# Patient Record
Sex: FEMALE | Race: BLACK OR AFRICAN AMERICAN | NOT HISPANIC OR LATINO | Employment: FULL TIME | RURAL
[De-identification: names, ages, dates, MRNs, and addresses within clinical notes are randomized per-mention and may not be internally consistent; named-entity substitution may affect disease eponyms.]

---

## 2018-01-09 ENCOUNTER — HISTORICAL (OUTPATIENT)
Dept: ADMINISTRATIVE | Facility: HOSPITAL | Age: 43
End: 2018-01-09

## 2018-01-10 LAB
LAB AP CLINICAL INFORMATION: NORMAL
LAB AP COMMENTS: NORMAL
LAB AP DIAGNOSIS - HISTORICAL: NORMAL
LAB AP GROSS PATHOLOGY - HISTORICAL: NORMAL
LAB AP SPECIMEN SUBMITTED - HISTORICAL: NORMAL

## 2021-05-19 ENCOUNTER — OFFICE VISIT (OUTPATIENT)
Dept: FAMILY MEDICINE | Facility: CLINIC | Age: 46
End: 2021-05-19
Payer: COMMERCIAL

## 2021-05-19 DIAGNOSIS — I10 ESSENTIAL HYPERTENSION: ICD-10-CM

## 2021-05-19 DIAGNOSIS — E03.0 CONGENITAL HYPOTHYROIDISM WITH DIFFUSE GOITER: ICD-10-CM

## 2021-05-19 DIAGNOSIS — E78.2 MIXED HYPERLIPIDEMIA: ICD-10-CM

## 2021-05-19 DIAGNOSIS — G89.29 CHRONIC BACK PAIN, UNSPECIFIED BACK LOCATION, UNSPECIFIED BACK PAIN LATERALITY: ICD-10-CM

## 2021-05-19 DIAGNOSIS — M54.9 CHRONIC BACK PAIN, UNSPECIFIED BACK LOCATION, UNSPECIFIED BACK PAIN LATERALITY: ICD-10-CM

## 2021-05-19 DIAGNOSIS — F32.A DEPRESSION, UNSPECIFIED DEPRESSION TYPE: Primary | ICD-10-CM

## 2021-05-19 PROCEDURE — 99214 PR OFFICE/OUTPT VISIT, EST, LEVL IV, 30-39 MIN: ICD-10-PCS | Mod: ,,, | Performed by: FAMILY MEDICINE

## 2021-05-19 PROCEDURE — 99214 OFFICE O/P EST MOD 30 MIN: CPT | Mod: ,,, | Performed by: FAMILY MEDICINE

## 2021-05-19 RX ORDER — AMLODIPINE BESYLATE 10 MG/1
10 TABLET ORAL DAILY
Qty: 30 TABLET | Refills: 5 | Status: SHIPPED | OUTPATIENT
Start: 2021-05-19 | End: 2021-12-28 | Stop reason: SDUPTHER

## 2021-05-19 RX ORDER — POTASSIUM CHLORIDE 750 MG/1
10 TABLET, EXTENDED RELEASE ORAL DAILY
Qty: 30 TABLET | Refills: 5 | Status: SHIPPED | OUTPATIENT
Start: 2021-05-19 | End: 2021-12-28 | Stop reason: SDUPTHER

## 2021-05-19 RX ORDER — FLUOXETINE HYDROCHLORIDE 20 MG/1
20 CAPSULE ORAL DAILY
COMMUNITY
Start: 2021-04-05 | End: 2021-05-19 | Stop reason: SDUPTHER

## 2021-05-19 RX ORDER — METHOCARBAMOL 500 MG/1
500 TABLET, FILM COATED ORAL 4 TIMES DAILY
Qty: 40 TABLET | Refills: 0 | Status: SHIPPED | OUTPATIENT
Start: 2021-05-19 | End: 2021-05-29

## 2021-05-19 RX ORDER — HYDROCHLOROTHIAZIDE 25 MG/1
25 TABLET ORAL DAILY
Qty: 30 TABLET | Refills: 5 | Status: SHIPPED | OUTPATIENT
Start: 2021-05-19 | End: 2021-12-28 | Stop reason: SDUPTHER

## 2021-05-19 RX ORDER — POTASSIUM CHLORIDE 750 MG/1
10 TABLET, EXTENDED RELEASE ORAL DAILY
COMMUNITY
Start: 2021-04-05 | End: 2021-05-19 | Stop reason: SDUPTHER

## 2021-05-19 RX ORDER — HYDROCHLOROTHIAZIDE 25 MG/1
25 TABLET ORAL DAILY
COMMUNITY
Start: 2021-04-05 | End: 2021-05-19 | Stop reason: SDUPTHER

## 2021-05-19 RX ORDER — FLUOXETINE HYDROCHLORIDE 20 MG/1
20 CAPSULE ORAL DAILY
Qty: 30 CAPSULE | Refills: 5 | Status: SHIPPED | OUTPATIENT
Start: 2021-05-19 | End: 2021-12-28 | Stop reason: SDUPTHER

## 2021-05-19 RX ORDER — LEVOTHYROXINE SODIUM 150 UG/1
150 TABLET ORAL DAILY
COMMUNITY
Start: 2021-04-05 | End: 2021-05-19 | Stop reason: SDUPTHER

## 2021-05-19 RX ORDER — LEVOTHYROXINE SODIUM 150 UG/1
150 TABLET ORAL DAILY
Qty: 30 TABLET | Refills: 5 | Status: SHIPPED | OUTPATIENT
Start: 2021-05-19 | End: 2021-12-28 | Stop reason: SDUPTHER

## 2021-05-19 RX ORDER — AMLODIPINE BESYLATE 10 MG/1
10 TABLET ORAL DAILY
COMMUNITY
Start: 2021-04-05 | End: 2021-05-19 | Stop reason: SDUPTHER

## 2021-05-19 RX ORDER — LOVASTATIN 10 MG/1
10 TABLET ORAL DAILY
Qty: 30 TABLET | Refills: 5 | Status: SHIPPED | OUTPATIENT
Start: 2021-05-19 | End: 2021-12-28 | Stop reason: SDUPTHER

## 2021-05-19 RX ORDER — MELOXICAM 7.5 MG/1
15 TABLET ORAL DAILY
Qty: 30 TABLET | Refills: 2 | Status: SHIPPED | OUTPATIENT
Start: 2021-05-19 | End: 2021-12-28 | Stop reason: SDUPTHER

## 2021-05-19 RX ORDER — LOVASTATIN 10 MG/1
10 TABLET ORAL DAILY
COMMUNITY
Start: 2021-04-05 | End: 2021-05-19 | Stop reason: SDUPTHER

## 2021-05-19 RX ORDER — MELOXICAM 7.5 MG/1
7.5 TABLET ORAL 2 TIMES DAILY
COMMUNITY
Start: 2021-04-05 | End: 2021-05-19 | Stop reason: SDUPTHER

## 2021-12-28 ENCOUNTER — OFFICE VISIT (OUTPATIENT)
Dept: FAMILY MEDICINE | Facility: CLINIC | Age: 46
End: 2021-12-28
Payer: COMMERCIAL

## 2021-12-28 VITALS
DIASTOLIC BLOOD PRESSURE: 76 MMHG | BODY MASS INDEX: 53.09 KG/M2 | HEART RATE: 87 BPM | HEIGHT: 61 IN | RESPIRATION RATE: 18 BRPM | WEIGHT: 281.19 LBS | SYSTOLIC BLOOD PRESSURE: 120 MMHG | OXYGEN SATURATION: 98 %

## 2021-12-28 DIAGNOSIS — M54.9 CHRONIC BACK PAIN, UNSPECIFIED BACK LOCATION, UNSPECIFIED BACK PAIN LATERALITY: ICD-10-CM

## 2021-12-28 DIAGNOSIS — Z12.31 SCREENING MAMMOGRAM, ENCOUNTER FOR: ICD-10-CM

## 2021-12-28 DIAGNOSIS — G89.29 CHRONIC BACK PAIN, UNSPECIFIED BACK LOCATION, UNSPECIFIED BACK PAIN LATERALITY: ICD-10-CM

## 2021-12-28 DIAGNOSIS — E78.2 MIXED HYPERLIPIDEMIA: ICD-10-CM

## 2021-12-28 DIAGNOSIS — F32.A DEPRESSION, UNSPECIFIED DEPRESSION TYPE: ICD-10-CM

## 2021-12-28 DIAGNOSIS — I10 ESSENTIAL HYPERTENSION: Primary | ICD-10-CM

## 2021-12-28 DIAGNOSIS — J01.00 ACUTE NON-RECURRENT MAXILLARY SINUSITIS: ICD-10-CM

## 2021-12-28 DIAGNOSIS — E03.0 CONGENITAL HYPOTHYROIDISM WITH DIFFUSE GOITER: ICD-10-CM

## 2021-12-28 DIAGNOSIS — E03.9 HYPOTHYROIDISM, UNSPECIFIED TYPE: ICD-10-CM

## 2021-12-28 LAB
ALBUMIN SERPL BCP-MCNC: 3.4 G/DL (ref 3.5–5)
ALBUMIN/GLOB SERPL: 0.7 {RATIO}
ALP SERPL-CCNC: 65 U/L (ref 39–100)
ALT SERPL W P-5'-P-CCNC: 21 U/L (ref 13–56)
ANION GAP SERPL CALCULATED.3IONS-SCNC: 9 MMOL/L (ref 7–16)
AST SERPL W P-5'-P-CCNC: 21 U/L (ref 15–37)
BASOPHILS # BLD AUTO: 0.04 K/UL (ref 0–0.2)
BASOPHILS NFR BLD AUTO: 0.2 % (ref 0–1)
BILIRUB SERPL-MCNC: 0.3 MG/DL (ref 0–1.2)
BUN SERPL-MCNC: 20 MG/DL (ref 7–18)
BUN/CREAT SERPL: 9 (ref 6–20)
CALCIUM SERPL-MCNC: 8.1 MG/DL (ref 8.5–10.1)
CHLORIDE SERPL-SCNC: 102 MMOL/L (ref 98–107)
CHOLEST SERPL-MCNC: 245 MG/DL (ref 0–200)
CHOLEST/HDLC SERPL: 4.9 {RATIO}
CO2 SERPL-SCNC: 32 MMOL/L (ref 21–32)
CREAT SERPL-MCNC: 2.23 MG/DL (ref 0.55–1.02)
DIFFERENTIAL METHOD BLD: ABNORMAL
EOSINOPHIL # BLD AUTO: 0.25 K/UL (ref 0–0.5)
EOSINOPHIL NFR BLD AUTO: 1.6 % (ref 1–4)
ERYTHROCYTE [DISTWIDTH] IN BLOOD BY AUTOMATED COUNT: 14.6 % (ref 11.5–14.5)
GLOBULIN SER-MCNC: 4.8 G/DL (ref 2–4)
GLUCOSE SERPL-MCNC: 102 MG/DL (ref 74–106)
HCT VFR BLD AUTO: 38.6 % (ref 38–47)
HDLC SERPL-MCNC: 50 MG/DL (ref 40–60)
HGB BLD-MCNC: 12.5 G/DL (ref 12–16)
IMM GRANULOCYTES # BLD AUTO: 0.07 K/UL (ref 0–0.04)
IMM GRANULOCYTES NFR BLD: 0.4 % (ref 0–0.4)
LDLC SERPL CALC-MCNC: 144 MG/DL
LDLC/HDLC SERPL: 2.9 {RATIO}
LYMPHOCYTES # BLD AUTO: 3.63 K/UL (ref 1–4.8)
LYMPHOCYTES NFR BLD AUTO: 22.6 % (ref 27–41)
MCH RBC QN AUTO: 28.7 PG (ref 27–31)
MCHC RBC AUTO-ENTMCNC: 32.4 G/DL (ref 32–36)
MCV RBC AUTO: 88.7 FL (ref 80–96)
MONOCYTES # BLD AUTO: 0.87 K/UL (ref 0–0.8)
MONOCYTES NFR BLD AUTO: 5.4 % (ref 2–6)
MPC BLD CALC-MCNC: 10.6 FL (ref 9.4–12.4)
NEUTROPHILS # BLD AUTO: 11.22 K/UL (ref 1.8–7.7)
NEUTROPHILS NFR BLD AUTO: 69.8 % (ref 53–65)
NONHDLC SERPL-MCNC: 195 MG/DL
NRBC # BLD AUTO: 0 X10E3/UL
NRBC, AUTO (.00): 0 %
PLATELET # BLD AUTO: 395 K/UL (ref 150–400)
POTASSIUM SERPL-SCNC: 2.8 MMOL/L (ref 3.5–5.1)
PROT SERPL-MCNC: 8.2 G/DL (ref 6.4–8.2)
RBC # BLD AUTO: 4.35 M/UL (ref 4.2–5.4)
SODIUM SERPL-SCNC: 140 MMOL/L (ref 136–145)
TRIGL SERPL-MCNC: 257 MG/DL (ref 35–150)
TSH SERPL DL<=0.005 MIU/L-ACNC: 2.14 UIU/ML (ref 0.36–3.74)
VLDLC SERPL-MCNC: 51 MG/DL
WBC # BLD AUTO: 16.08 K/UL (ref 4.5–11)

## 2021-12-28 PROCEDURE — 3078F PR MOST RECENT DIASTOLIC BLOOD PRESSURE < 80 MM HG: ICD-10-PCS | Mod: CPTII,,, | Performed by: NURSE PRACTITIONER

## 2021-12-28 PROCEDURE — 96372 PR INJECTION,THERAP/PROPH/DIAG2ST, IM OR SUBCUT: ICD-10-PCS | Mod: ,,, | Performed by: NURSE PRACTITIONER

## 2021-12-28 PROCEDURE — 85025 COMPLETE CBC W/AUTO DIFF WBC: CPT | Mod: ,,, | Performed by: CLINICAL MEDICAL LABORATORY

## 2021-12-28 PROCEDURE — 80061 LIPID PANEL: CPT | Mod: ,,, | Performed by: CLINICAL MEDICAL LABORATORY

## 2021-12-28 PROCEDURE — 80053 COMPREHENSIVE METABOLIC PANEL: ICD-10-PCS | Mod: ,,, | Performed by: CLINICAL MEDICAL LABORATORY

## 2021-12-28 PROCEDURE — 99213 OFFICE O/P EST LOW 20 MIN: CPT | Mod: 25,,, | Performed by: NURSE PRACTITIONER

## 2021-12-28 PROCEDURE — 96372 THER/PROPH/DIAG INJ SC/IM: CPT | Mod: ,,, | Performed by: NURSE PRACTITIONER

## 2021-12-28 PROCEDURE — 1160F PR REVIEW ALL MEDS BY PRESCRIBER/CLIN PHARMACIST DOCUMENTED: ICD-10-PCS | Mod: CPTII,,, | Performed by: NURSE PRACTITIONER

## 2021-12-28 PROCEDURE — 3078F DIAST BP <80 MM HG: CPT | Mod: CPTII,,, | Performed by: NURSE PRACTITIONER

## 2021-12-28 PROCEDURE — 99213 PR OFFICE/OUTPT VISIT, EST, LEVL III, 20-29 MIN: ICD-10-PCS | Mod: 25,,, | Performed by: NURSE PRACTITIONER

## 2021-12-28 PROCEDURE — 85025 CBC WITH DIFFERENTIAL: ICD-10-PCS | Mod: ,,, | Performed by: CLINICAL MEDICAL LABORATORY

## 2021-12-28 PROCEDURE — 1159F MED LIST DOCD IN RCRD: CPT | Mod: CPTII,,, | Performed by: NURSE PRACTITIONER

## 2021-12-28 PROCEDURE — 3008F PR BODY MASS INDEX (BMI) DOCUMENTED: ICD-10-PCS | Mod: CPTII,,, | Performed by: NURSE PRACTITIONER

## 2021-12-28 PROCEDURE — 84443 TSH: ICD-10-PCS | Mod: ,,, | Performed by: CLINICAL MEDICAL LABORATORY

## 2021-12-28 PROCEDURE — 84443 ASSAY THYROID STIM HORMONE: CPT | Mod: ,,, | Performed by: CLINICAL MEDICAL LABORATORY

## 2021-12-28 PROCEDURE — 1160F RVW MEDS BY RX/DR IN RCRD: CPT | Mod: CPTII,,, | Performed by: NURSE PRACTITIONER

## 2021-12-28 PROCEDURE — 3074F PR MOST RECENT SYSTOLIC BLOOD PRESSURE < 130 MM HG: ICD-10-PCS | Mod: CPTII,,, | Performed by: NURSE PRACTITIONER

## 2021-12-28 PROCEDURE — 80053 COMPREHEN METABOLIC PANEL: CPT | Mod: ,,, | Performed by: CLINICAL MEDICAL LABORATORY

## 2021-12-28 PROCEDURE — 1159F PR MEDICATION LIST DOCUMENTED IN MEDICAL RECORD: ICD-10-PCS | Mod: CPTII,,, | Performed by: NURSE PRACTITIONER

## 2021-12-28 PROCEDURE — 3074F SYST BP LT 130 MM HG: CPT | Mod: CPTII,,, | Performed by: NURSE PRACTITIONER

## 2021-12-28 PROCEDURE — 80061 LIPID PANEL: ICD-10-PCS | Mod: ,,, | Performed by: CLINICAL MEDICAL LABORATORY

## 2021-12-28 PROCEDURE — 3008F BODY MASS INDEX DOCD: CPT | Mod: CPTII,,, | Performed by: NURSE PRACTITIONER

## 2021-12-28 RX ORDER — HYDROCHLOROTHIAZIDE 25 MG/1
25 TABLET ORAL DAILY
Qty: 30 TABLET | Refills: 5 | Status: SHIPPED | OUTPATIENT
Start: 2021-12-28 | End: 2022-09-06 | Stop reason: SDUPTHER

## 2021-12-28 RX ORDER — CEFTRIAXONE 1 G/1
1 INJECTION, POWDER, FOR SOLUTION INTRAMUSCULAR; INTRAVENOUS
Status: COMPLETED | OUTPATIENT
Start: 2021-12-28 | End: 2021-12-28

## 2021-12-28 RX ORDER — AZITHROMYCIN 250 MG/1
TABLET, FILM COATED ORAL
Qty: 6 TABLET | Refills: 0 | Status: SHIPPED | OUTPATIENT
Start: 2021-12-28 | End: 2023-04-14 | Stop reason: ALTCHOICE

## 2021-12-28 RX ORDER — MELOXICAM 7.5 MG/1
15 TABLET ORAL DAILY
Qty: 30 TABLET | Refills: 2 | Status: SHIPPED | OUTPATIENT
Start: 2021-12-28 | End: 2022-09-06 | Stop reason: SDUPTHER

## 2021-12-28 RX ORDER — AMLODIPINE BESYLATE 10 MG/1
10 TABLET ORAL DAILY
Qty: 30 TABLET | Refills: 5 | Status: SHIPPED | OUTPATIENT
Start: 2021-12-28 | End: 2022-09-06 | Stop reason: SDUPTHER

## 2021-12-28 RX ORDER — POTASSIUM CHLORIDE 750 MG/1
10 TABLET, EXTENDED RELEASE ORAL DAILY
Qty: 30 TABLET | Refills: 5 | Status: SHIPPED | OUTPATIENT
Start: 2021-12-28 | End: 2022-09-06 | Stop reason: SDUPTHER

## 2021-12-28 RX ORDER — LOVASTATIN 10 MG/1
10 TABLET ORAL DAILY
Qty: 30 TABLET | Refills: 5 | Status: SHIPPED | OUTPATIENT
Start: 2021-12-28 | End: 2022-09-06 | Stop reason: SDUPTHER

## 2021-12-28 RX ORDER — METHYLPREDNISOLONE ACETATE 40 MG/ML
40 INJECTION, SUSPENSION INTRA-ARTICULAR; INTRALESIONAL; INTRAMUSCULAR; SOFT TISSUE
Status: COMPLETED | OUTPATIENT
Start: 2021-12-28 | End: 2021-12-28

## 2021-12-28 RX ORDER — FLUOXETINE HYDROCHLORIDE 20 MG/1
20 CAPSULE ORAL DAILY
Qty: 30 CAPSULE | Refills: 5 | Status: SHIPPED | OUTPATIENT
Start: 2021-12-28 | End: 2022-09-06 | Stop reason: SDUPTHER

## 2021-12-28 RX ORDER — LEVOTHYROXINE SODIUM 150 UG/1
150 TABLET ORAL DAILY
Qty: 30 TABLET | Refills: 5 | Status: SHIPPED | OUTPATIENT
Start: 2021-12-28 | End: 2022-09-06 | Stop reason: SDUPTHER

## 2021-12-28 RX ADMIN — METHYLPREDNISOLONE ACETATE 40 MG: 40 INJECTION, SUSPENSION INTRA-ARTICULAR; INTRALESIONAL; INTRAMUSCULAR; SOFT TISSUE at 11:12

## 2021-12-28 RX ADMIN — CEFTRIAXONE 1 G: 1 INJECTION, POWDER, FOR SOLUTION INTRAMUSCULAR; INTRAVENOUS at 11:12

## 2022-09-06 ENCOUNTER — OFFICE VISIT (OUTPATIENT)
Dept: FAMILY MEDICINE | Facility: CLINIC | Age: 47
End: 2022-09-06
Payer: COMMERCIAL

## 2022-09-06 VITALS
BODY MASS INDEX: 50.45 KG/M2 | RESPIRATION RATE: 18 BRPM | HEART RATE: 85 BPM | WEIGHT: 267.19 LBS | DIASTOLIC BLOOD PRESSURE: 92 MMHG | HEIGHT: 61 IN | TEMPERATURE: 98 F | SYSTOLIC BLOOD PRESSURE: 140 MMHG | OXYGEN SATURATION: 96 %

## 2022-09-06 DIAGNOSIS — G89.29 CHRONIC BACK PAIN, UNSPECIFIED BACK LOCATION, UNSPECIFIED BACK PAIN LATERALITY: ICD-10-CM

## 2022-09-06 DIAGNOSIS — M54.9 CHRONIC BACK PAIN, UNSPECIFIED BACK LOCATION, UNSPECIFIED BACK PAIN LATERALITY: ICD-10-CM

## 2022-09-06 DIAGNOSIS — F32.A DEPRESSION, UNSPECIFIED DEPRESSION TYPE: ICD-10-CM

## 2022-09-06 DIAGNOSIS — E78.2 MIXED HYPERLIPIDEMIA: ICD-10-CM

## 2022-09-06 DIAGNOSIS — I10 ESSENTIAL HYPERTENSION: Primary | ICD-10-CM

## 2022-09-06 DIAGNOSIS — E03.0 CONGENITAL HYPOTHYROIDISM WITH DIFFUSE GOITER: ICD-10-CM

## 2022-09-06 LAB
ALBUMIN SERPL BCP-MCNC: 3.6 G/DL (ref 3.5–5)
ALBUMIN/GLOB SERPL: 0.9 {RATIO}
ALP SERPL-CCNC: 66 U/L (ref 39–100)
ALT SERPL W P-5'-P-CCNC: 19 U/L (ref 13–56)
ANION GAP SERPL CALCULATED.3IONS-SCNC: 11 MMOL/L (ref 7–16)
AST SERPL W P-5'-P-CCNC: 21 U/L (ref 15–37)
BASOPHILS # BLD AUTO: 0.06 K/UL (ref 0–0.2)
BASOPHILS NFR BLD AUTO: 0.5 % (ref 0–1)
BILIRUB SERPL-MCNC: 0.3 MG/DL (ref ?–1.2)
BUN SERPL-MCNC: 17 MG/DL (ref 7–18)
BUN/CREAT SERPL: 7 (ref 6–20)
CALCIUM SERPL-MCNC: 7.7 MG/DL (ref 8.5–10.1)
CHLORIDE SERPL-SCNC: 104 MMOL/L (ref 98–107)
CHOLEST SERPL-MCNC: 310 MG/DL (ref 0–200)
CHOLEST/HDLC SERPL: 5.7 {RATIO}
CO2 SERPL-SCNC: 28 MMOL/L (ref 21–32)
CREAT SERPL-MCNC: 2.36 MG/DL (ref 0.55–1.02)
DIFFERENTIAL METHOD BLD: ABNORMAL
EGFR (NO RACE VARIABLE) (RUSH/TITUS): 25 ML/MIN/1.73M²
EOSINOPHIL # BLD AUTO: 0.37 K/UL (ref 0–0.5)
EOSINOPHIL NFR BLD AUTO: 3 % (ref 1–4)
ERYTHROCYTE [DISTWIDTH] IN BLOOD BY AUTOMATED COUNT: 15.2 % (ref 11.5–14.5)
GLOBULIN SER-MCNC: 4.1 G/DL (ref 2–4)
GLUCOSE SERPL-MCNC: 105 MG/DL (ref 74–106)
HCT VFR BLD AUTO: 41.5 % (ref 38–47)
HDLC SERPL-MCNC: 54 MG/DL (ref 40–60)
HGB BLD-MCNC: 13.6 G/DL (ref 12–16)
IMM GRANULOCYTES # BLD AUTO: 0.04 K/UL (ref 0–0.04)
IMM GRANULOCYTES NFR BLD: 0.3 % (ref 0–0.4)
LDLC SERPL CALC-MCNC: 186 MG/DL
LDLC/HDLC SERPL: 3.4 {RATIO}
LYMPHOCYTES # BLD AUTO: 3.4 K/UL (ref 1–4.8)
LYMPHOCYTES NFR BLD AUTO: 27.9 % (ref 27–41)
MCH RBC QN AUTO: 28.9 PG (ref 27–31)
MCHC RBC AUTO-ENTMCNC: 32.8 G/DL (ref 32–36)
MCV RBC AUTO: 88.1 FL (ref 80–96)
MONOCYTES # BLD AUTO: 0.65 K/UL (ref 0–0.8)
MONOCYTES NFR BLD AUTO: 5.3 % (ref 2–6)
MPC BLD CALC-MCNC: 10.1 FL (ref 9.4–12.4)
NEUTROPHILS # BLD AUTO: 7.67 K/UL (ref 1.8–7.7)
NEUTROPHILS NFR BLD AUTO: 63 % (ref 53–65)
NONHDLC SERPL-MCNC: 256 MG/DL
NRBC # BLD AUTO: 0 X10E3/UL
NRBC, AUTO (.00): 0 %
PLATELET # BLD AUTO: 378 K/UL (ref 150–400)
POTASSIUM SERPL-SCNC: 3.2 MMOL/L (ref 3.5–5.1)
PROT SERPL-MCNC: 7.7 G/DL (ref 6.4–8.2)
RBC # BLD AUTO: 4.71 M/UL (ref 4.2–5.4)
SODIUM SERPL-SCNC: 140 MMOL/L (ref 136–145)
TRIGL SERPL-MCNC: 348 MG/DL (ref 35–150)
TSH SERPL DL<=0.005 MIU/L-ACNC: 26.7 UIU/ML (ref 0.36–3.74)
VLDLC SERPL-MCNC: 70 MG/DL
WBC # BLD AUTO: 12.19 K/UL (ref 4.5–11)

## 2022-09-06 PROCEDURE — 1159F MED LIST DOCD IN RCRD: CPT | Mod: ,,, | Performed by: NURSE PRACTITIONER

## 2022-09-06 PROCEDURE — 1160F RVW MEDS BY RX/DR IN RCRD: CPT | Mod: ,,, | Performed by: NURSE PRACTITIONER

## 2022-09-06 PROCEDURE — 99213 PR OFFICE/OUTPT VISIT, EST, LEVL III, 20-29 MIN: ICD-10-PCS | Mod: ,,, | Performed by: NURSE PRACTITIONER

## 2022-09-06 PROCEDURE — 3008F BODY MASS INDEX DOCD: CPT | Mod: ,,, | Performed by: NURSE PRACTITIONER

## 2022-09-06 PROCEDURE — 3077F SYST BP >= 140 MM HG: CPT | Mod: ,,, | Performed by: NURSE PRACTITIONER

## 2022-09-06 PROCEDURE — 1159F PR MEDICATION LIST DOCUMENTED IN MEDICAL RECORD: ICD-10-PCS | Mod: ,,, | Performed by: NURSE PRACTITIONER

## 2022-09-06 PROCEDURE — 80061 LIPID PANEL: ICD-10-PCS | Mod: ICN,,, | Performed by: CLINICAL MEDICAL LABORATORY

## 2022-09-06 PROCEDURE — 80050 GENERAL HEALTH PANEL: CPT | Mod: ICN,,, | Performed by: CLINICAL MEDICAL LABORATORY

## 2022-09-06 PROCEDURE — 3080F PR MOST RECENT DIASTOLIC BLOOD PRESSURE >= 90 MM HG: ICD-10-PCS | Mod: ,,, | Performed by: NURSE PRACTITIONER

## 2022-09-06 PROCEDURE — 3077F PR MOST RECENT SYSTOLIC BLOOD PRESSURE >= 140 MM HG: ICD-10-PCS | Mod: ,,, | Performed by: NURSE PRACTITIONER

## 2022-09-06 PROCEDURE — 3008F PR BODY MASS INDEX (BMI) DOCUMENTED: ICD-10-PCS | Mod: ,,, | Performed by: NURSE PRACTITIONER

## 2022-09-06 PROCEDURE — 80061 LIPID PANEL: CPT | Mod: ICN,,, | Performed by: CLINICAL MEDICAL LABORATORY

## 2022-09-06 PROCEDURE — 3080F DIAST BP >= 90 MM HG: CPT | Mod: ,,, | Performed by: NURSE PRACTITIONER

## 2022-09-06 PROCEDURE — 1160F PR REVIEW ALL MEDS BY PRESCRIBER/CLIN PHARMACIST DOCUMENTED: ICD-10-PCS | Mod: ,,, | Performed by: NURSE PRACTITIONER

## 2022-09-06 PROCEDURE — 99213 OFFICE O/P EST LOW 20 MIN: CPT | Mod: ,,, | Performed by: NURSE PRACTITIONER

## 2022-09-06 PROCEDURE — 80050 PR  GENERAL HEALTH PANEL: ICD-10-PCS | Mod: ICN,,, | Performed by: CLINICAL MEDICAL LABORATORY

## 2022-09-06 RX ORDER — LOVASTATIN 10 MG/1
10 TABLET ORAL DAILY
Qty: 30 TABLET | Refills: 5 | Status: SHIPPED | OUTPATIENT
Start: 2022-09-06 | End: 2023-04-14 | Stop reason: SDUPTHER

## 2022-09-06 RX ORDER — LEVOTHYROXINE SODIUM 150 UG/1
150 TABLET ORAL DAILY
Qty: 30 TABLET | Refills: 5 | Status: SHIPPED | OUTPATIENT
Start: 2022-09-06 | End: 2023-04-14 | Stop reason: SDUPTHER

## 2022-09-06 RX ORDER — AMLODIPINE BESYLATE 10 MG/1
10 TABLET ORAL DAILY
Qty: 30 TABLET | Refills: 5 | Status: SHIPPED | OUTPATIENT
Start: 2022-09-06 | End: 2023-04-14 | Stop reason: SDUPTHER

## 2022-09-06 RX ORDER — FLUOXETINE HYDROCHLORIDE 20 MG/1
20 CAPSULE ORAL DAILY
Qty: 30 CAPSULE | Refills: 5 | Status: SHIPPED | OUTPATIENT
Start: 2022-09-06 | End: 2023-04-14 | Stop reason: SDUPTHER

## 2022-09-06 RX ORDER — POTASSIUM CHLORIDE 750 MG/1
10 TABLET, EXTENDED RELEASE ORAL DAILY
Qty: 30 TABLET | Refills: 5 | Status: SHIPPED | OUTPATIENT
Start: 2022-09-06 | End: 2023-04-14 | Stop reason: SDUPTHER

## 2022-09-06 RX ORDER — HYDROCHLOROTHIAZIDE 25 MG/1
25 TABLET ORAL DAILY
Qty: 30 TABLET | Refills: 5 | Status: SHIPPED | OUTPATIENT
Start: 2022-09-06 | End: 2023-04-14 | Stop reason: SDUPTHER

## 2022-09-06 RX ORDER — MELOXICAM 7.5 MG/1
15 TABLET ORAL DAILY
Qty: 60 TABLET | Refills: 5 | Status: SHIPPED | OUTPATIENT
Start: 2022-09-06 | End: 2023-04-14 | Stop reason: SDUPTHER

## 2022-09-06 NOTE — PROGRESS NOTES
SÁNCHEZ Champion   [facility]  [address]  [place] [address]     PATIENT NAME: Elif Romo  : 1975  DATE: 22  MRN: [id number]      Billing Provider: SÁNCHEZ Champion  Level of Service:   Patient PCP Information       Provider PCP Type    SÁNCHEZ Champion General            Reason for Visit / Chief Complaint: Hypertension (Here for check up and med refills.), Hyperlipidemia, and Hypothyroidism       Update PCP  Update Chief Complaint         History of Present Illness / Problem Focused Workflow     Elif Romo presents to the clinic for follow up on HTN, hyperlipidemia and check TSH. Pt has been out of BP medication for 3 days.       Review of Systems     Review of Systems   Constitutional: Negative.  Negative for activity change, appetite change, fatigue and unexpected weight change.   Eyes:  Negative for visual disturbance.   Respiratory:  Negative for cough, chest tightness and shortness of breath.    Cardiovascular:  Negative for chest pain, palpitations and leg swelling.   Gastrointestinal:  Negative for abdominal pain, blood in stool, change in bowel habit, constipation, diarrhea, nausea, vomiting and change in bowel habit.   Endocrine: Negative for cold intolerance, heat intolerance, polydipsia, polyphagia and polyuria.   Genitourinary:  Negative for dysuria, frequency and pelvic pain.   Neurological:  Negative for dizziness, weakness and headaches.   Psychiatric/Behavioral:  Negative for sleep disturbance.       Medical / Social / Family History     Past Medical History:   Diagnosis Date    Anxiety     Depression     Hyperlipidemia     Hypertension     Hypothyroidism        Past Surgical History:   Procedure Laterality Date    TOTAL THYROIDECTOMY  2018           Social History  Ms.  reports that she has never smoked. She has never used smokeless tobacco. She reports current alcohol use. She reports that she does not use drugs.    Family  "History  Ms.'s family history includes Asthma in her daughter; Depression in her mother; Diabetes in her father; Heart disease in her mother; Hypertension in her father, maternal grandfather, and mother.    Medications and Allergies     Medications  Outpatient Medications Marked as Taking for the 9/6/22 encounter (Office Visit) with SÁNCHEZ Chambers   Medication Sig Dispense Refill    mv-min/iron/folic/calcium/vitK (WOMEN'S MULTIVITAMIN ORAL) Take 1 tablet by mouth once daily.      [DISCONTINUED] amLODIPine (NORVASC) 10 MG tablet Take 1 tablet (10 mg total) by mouth once daily. 30 tablet 5    [DISCONTINUED] FLUoxetine 20 MG capsule Take 1 capsule (20 mg total) by mouth once daily. 30 capsule 5    [DISCONTINUED] hydroCHLOROthiazide (HYDRODIURIL) 25 MG tablet Take 1 tablet (25 mg total) by mouth once daily. 30 tablet 5    [DISCONTINUED] levothyroxine (EUTHYROX) 150 MCG tablet Take 1 tablet (150 mcg total) by mouth once daily. 30 tablet 5    [DISCONTINUED] lovastatin (MEVACOR) 10 MG tablet Take 1 tablet (10 mg total) by mouth once daily. 30 tablet 5    [DISCONTINUED] meloxicam (MOBIC) 7.5 MG tablet Take 2 tablets (15 mg total) by mouth once daily. 30 tablet 2    [DISCONTINUED] potassium chloride (KLOR-CON) 10 MEQ TbSR Take 1 tablet (10 mEq total) by mouth once daily. 30 tablet 5       Allergies  Review of patient's allergies indicates:   Allergen Reactions    Bactrim [sulfamethoxazole-trimethoprim] Hives    Ceclor [cefaclor] Hives       Physical Examination     Vitals:    09/06/22 1521 09/06/22 1537   BP: (!) 150/100 (!) 140/92   BP Location: Right arm Right arm   Patient Position: Sitting Sitting   BP Method: Large (Manual) Large (Manual)   Pulse: 85    Resp: 18    Temp: 98.2 °F (36.8 °C)    TempSrc: Oral    SpO2: 96%    Weight: 121.2 kg (267 lb 3.2 oz)    Height: 5' 1" (1.549 m)       Physical Exam  Constitutional:       General: She is not in acute distress.     Appearance: Normal appearance.   Eyes:      " General: No scleral icterus.     Conjunctiva/sclera: Conjunctivae normal.   Neck:      Thyroid: No thyromegaly.      Vascular: No carotid bruit.   Cardiovascular:      Rate and Rhythm: Normal rate and regular rhythm.      Pulses: Normal pulses.      Heart sounds: Normal heart sounds. No murmur heard.     Comments: Mild non-pitting pedal edema satnam ankles  Pulmonary:      Effort: Pulmonary effort is normal. No accessory muscle usage or respiratory distress.      Breath sounds: Normal breath sounds. No wheezing, rhonchi or rales.   Abdominal:      General: Bowel sounds are normal. There is no distension.      Palpations: Abdomen is soft.      Tenderness: There is no abdominal tenderness. There is no guarding.   Musculoskeletal:         General: Normal range of motion.      Cervical back: Neck supple.   Skin:     General: Skin is warm and dry.      Capillary Refill: Capillary refill takes less than 2 seconds.      Coloration: Skin is not jaundiced or pale.   Neurological:      Mental Status: She is alert and oriented to person, place, and time.      Sensory: Sensation is intact.      Gait: Gait is intact.        Assessment and Plan (including Health Maintenance)      Problem List  Smart Sets  Document Outside HM   :  Lab Results   Component Value Date    CHOL 245 (H) 12/28/2021     Lab Results   Component Value Date    HDL 50 12/28/2021     Lab Results   Component Value Date    LDLCALC 144 12/28/2021     Lab Results   Component Value Date    TRIG 257 (H) 12/28/2021     Lab Results   Component Value Date    CHOLHDL 4.9 12/28/2021     Lab Results   Component Value Date    TSH 2.140 12/28/2021     CMP  Sodium   Date Value Ref Range Status   12/28/2021 140 136 - 145 mmol/L Final     Potassium   Date Value Ref Range Status   12/28/2021 2.8 (L) 3.5 - 5.1 mmol/L Final     Chloride   Date Value Ref Range Status   12/28/2021 102 98 - 107 mmol/L Final     CO2   Date Value Ref Range Status   12/28/2021 32 21 - 32 mmol/L Final      Glucose   Date Value Ref Range Status   12/28/2021 102 74 - 106 mg/dL Final     BUN   Date Value Ref Range Status   12/28/2021 20 (H) 7 - 18 mg/dL Final     Creatinine   Date Value Ref Range Status   12/28/2021 2.23 (H) 0.55 - 1.02 mg/dL Final     Calcium   Date Value Ref Range Status   12/28/2021 8.1 (L) 8.5 - 10.1 mg/dL Final     Total Protein   Date Value Ref Range Status   12/28/2021 8.2 6.4 - 8.2 g/dL Final     Albumin   Date Value Ref Range Status   12/28/2021 3.4 (L) 3.5 - 5.0 g/dL Final     Bilirubin, Total   Date Value Ref Range Status   12/28/2021 0.3 0.0 - 1.2 mg/dL Final     Alk Phos   Date Value Ref Range Status   12/28/2021 65 39 - 100 U/L Final     AST   Date Value Ref Range Status   12/28/2021 21 15 - 37 U/L Final     ALT   Date Value Ref Range Status   12/28/2021 21 13 - 56 U/L Final     Anion Gap   Date Value Ref Range Status   12/28/2021 9 7 - 16 mmol/L Final     eGFR   Date Value Ref Range Status   12/28/2021 25 (L) >=60 mL/min/1.73m² Final             Health Maintenance Due   Topic Date Due    Cervical Cancer Screening  Never done    Mammogram  Never done    COVID-19 Vaccine (3 - Booster for Moderna series) 01/06/2022    Influenza Vaccine (1) 09/01/2022       Problem List Items Addressed This Visit          Psychiatric    Depression    Relevant Medications    FLUoxetine 20 MG capsule       Cardiac/Vascular    Hyperlipidemia    Relevant Medications    lovastatin (MEVACOR) 10 MG tablet    Other Relevant Orders    Lipid Panel       Endocrine    Hypothyroidism    Relevant Medications    levothyroxine (EUTHYROX) 150 MCG tablet    Other Relevant Orders    TSH       Orthopedic    Chronic back pain    Relevant Medications    meloxicam (MOBIC) 7.5 MG tablet     Other Visit Diagnoses       Essential hypertension    -  Primary    CBC and CMP ordered. Continue current medication and diet. Do not let BP medication run out.    Relevant Medications    amLODIPine (NORVASC) 10 MG tablet     hydroCHLOROthiazide (HYDRODIURIL) 25 MG tablet    potassium chloride (KLOR-CON) 10 MEQ TbSR    Other Relevant Orders    CBC Auto Differential    Comprehensive Metabolic Panel          Essential hypertension  Comments:  CBC and CMP ordered. Continue current medication and diet. Do not let BP medication run out.  Orders:  -     CBC Auto Differential; Future; Expected date: 09/06/2022  -     Comprehensive Metabolic Panel; Future; Expected date: 09/06/2022  -     amLODIPine (NORVASC) 10 MG tablet; Take 1 tablet (10 mg total) by mouth once daily.  Dispense: 30 tablet; Refill: 5  -     hydroCHLOROthiazide (HYDRODIURIL) 25 MG tablet; Take 1 tablet (25 mg total) by mouth once daily.  Dispense: 30 tablet; Refill: 5  -     potassium chloride (KLOR-CON) 10 MEQ TbSR; Take 1 tablet (10 mEq total) by mouth once daily.  Dispense: 30 tablet; Refill: 5    Mixed hyperlipidemia  Comments:  Fasting lipids ordered. Continue current medication and LFLC diet. LDL goal < 70  Orders:  -     Lipid Panel; Future; Expected date: 09/06/2022  -     lovastatin (MEVACOR) 10 MG tablet; Take 1 tablet (10 mg total) by mouth once daily.  Dispense: 30 tablet; Refill: 5    Congenital hypothyroidism with diffuse goiter  Comments:  TSH ordered. Continue current medication dose, take med on empty stomach.  Orders:  -     TSH; Future; Expected date: 09/06/2022  -     levothyroxine (EUTHYROX) 150 MCG tablet; Take 1 tablet (150 mcg total) by mouth once daily.  Dispense: 30 tablet; Refill: 5    Depression, unspecified depression type  Comments:  Continue current medication  Orders:  -     FLUoxetine 20 MG capsule; Take 1 capsule (20 mg total) by mouth once daily.  Dispense: 30 capsule; Refill: 5    Chronic back pain, unspecified back location, unspecified back pain laterality  -     meloxicam (MOBIC) 7.5 MG tablet; Take 2 tablets (15 mg total) by mouth once daily.  Dispense: 60 tablet; Refill: 5     Health Maintenance Topics with due status: Not Due        Topic Last Completion Date    Lipid Panel 12/28/2021       Procedures          Follow up in about 1 month (around 10/6/2022) for recheck BP.     Signature:  SÁNCHEZ Champion    Date of encounter: 9/6/22

## 2023-04-14 ENCOUNTER — OFFICE VISIT (OUTPATIENT)
Dept: FAMILY MEDICINE | Facility: CLINIC | Age: 48
End: 2023-04-14
Payer: COMMERCIAL

## 2023-04-14 VITALS
HEIGHT: 61 IN | RESPIRATION RATE: 17 BRPM | BODY MASS INDEX: 51.7 KG/M2 | DIASTOLIC BLOOD PRESSURE: 84 MMHG | SYSTOLIC BLOOD PRESSURE: 138 MMHG | OXYGEN SATURATION: 97 % | HEART RATE: 94 BPM | WEIGHT: 273.81 LBS | TEMPERATURE: 98 F

## 2023-04-14 DIAGNOSIS — E78.2 MIXED HYPERLIPIDEMIA: ICD-10-CM

## 2023-04-14 DIAGNOSIS — M54.9 CHRONIC BACK PAIN, UNSPECIFIED BACK LOCATION, UNSPECIFIED BACK PAIN LATERALITY: ICD-10-CM

## 2023-04-14 DIAGNOSIS — G89.29 CHRONIC BACK PAIN, UNSPECIFIED BACK LOCATION, UNSPECIFIED BACK PAIN LATERALITY: ICD-10-CM

## 2023-04-14 DIAGNOSIS — I10 PRIMARY HYPERTENSION: Primary | ICD-10-CM

## 2023-04-14 DIAGNOSIS — J01.40 ACUTE NON-RECURRENT PANSINUSITIS: ICD-10-CM

## 2023-04-14 DIAGNOSIS — E03.0 CONGENITAL HYPOTHYROIDISM WITH DIFFUSE GOITER: ICD-10-CM

## 2023-04-14 DIAGNOSIS — F32.A DEPRESSION, UNSPECIFIED DEPRESSION TYPE: ICD-10-CM

## 2023-04-14 LAB
ALBUMIN SERPL BCP-MCNC: 3.7 G/DL (ref 3.5–5)
ALBUMIN/GLOB SERPL: 0.8 {RATIO}
ALP SERPL-CCNC: 68 U/L (ref 39–100)
ALT SERPL W P-5'-P-CCNC: 15 U/L (ref 13–56)
ANION GAP SERPL CALCULATED.3IONS-SCNC: 9 MMOL/L (ref 7–16)
AST SERPL W P-5'-P-CCNC: 23 U/L (ref 15–37)
BASOPHILS # BLD AUTO: 0.08 K/UL (ref 0–0.2)
BASOPHILS NFR BLD AUTO: 0.5 % (ref 0–1)
BILIRUB SERPL-MCNC: 0.3 MG/DL (ref ?–1.2)
BUN SERPL-MCNC: 24 MG/DL (ref 7–18)
BUN/CREAT SERPL: 8 (ref 6–20)
CALCIUM SERPL-MCNC: 7.2 MG/DL (ref 8.5–10.1)
CHLORIDE SERPL-SCNC: 104 MMOL/L (ref 98–107)
CHOLEST SERPL-MCNC: 260 MG/DL (ref 0–200)
CHOLEST/HDLC SERPL: 4.3 {RATIO}
CO2 SERPL-SCNC: 30 MMOL/L (ref 21–32)
CREAT SERPL-MCNC: 2.86 MG/DL (ref 0.55–1.02)
DIFFERENTIAL METHOD BLD: ABNORMAL
EGFR (NO RACE VARIABLE) (RUSH/TITUS): 20 ML/MIN/1.73M²
EOSINOPHIL # BLD AUTO: 0.29 K/UL (ref 0–0.5)
EOSINOPHIL NFR BLD AUTO: 1.8 % (ref 1–4)
ERYTHROCYTE [DISTWIDTH] IN BLOOD BY AUTOMATED COUNT: 14.8 % (ref 11.5–14.5)
GLOBULIN SER-MCNC: 4.4 G/DL (ref 2–4)
GLUCOSE SERPL-MCNC: 80 MG/DL (ref 74–106)
HCT VFR BLD AUTO: 40.5 % (ref 38–47)
HDLC SERPL-MCNC: 60 MG/DL (ref 40–60)
HGB BLD-MCNC: 12.7 G/DL (ref 12–16)
IMM GRANULOCYTES # BLD AUTO: 0.06 K/UL (ref 0–0.04)
IMM GRANULOCYTES NFR BLD: 0.4 % (ref 0–0.4)
LDLC SERPL CALC-MCNC: 141 MG/DL
LDLC/HDLC SERPL: 2.4 {RATIO}
LYMPHOCYTES # BLD AUTO: 3.6 K/UL (ref 1–4.8)
LYMPHOCYTES NFR BLD AUTO: 22.1 % (ref 27–41)
MCH RBC QN AUTO: 28.7 PG (ref 27–31)
MCHC RBC AUTO-ENTMCNC: 31.4 G/DL (ref 32–36)
MCV RBC AUTO: 91.6 FL (ref 80–96)
MONOCYTES # BLD AUTO: 0.83 K/UL (ref 0–0.8)
MONOCYTES NFR BLD AUTO: 5.1 % (ref 2–6)
MPC BLD CALC-MCNC: 9.3 FL (ref 9.4–12.4)
NEUTROPHILS # BLD AUTO: 11.46 K/UL (ref 1.8–7.7)
NEUTROPHILS NFR BLD AUTO: 70.1 % (ref 53–65)
NONHDLC SERPL-MCNC: 200 MG/DL
NRBC # BLD AUTO: 0.03 X10E3/UL
NRBC, AUTO (.00): 0.2 %
PLATELET # BLD AUTO: 391 K/UL (ref 150–400)
POTASSIUM SERPL-SCNC: 3.2 MMOL/L (ref 3.5–5.1)
PROT SERPL-MCNC: 8.1 G/DL (ref 6.4–8.2)
RBC # BLD AUTO: 4.42 M/UL (ref 4.2–5.4)
SODIUM SERPL-SCNC: 140 MMOL/L (ref 136–145)
TRIGL SERPL-MCNC: 296 MG/DL (ref 35–150)
TSH SERPL DL<=0.005 MIU/L-ACNC: 1.82 UIU/ML (ref 0.36–3.74)
VLDLC SERPL-MCNC: 59 MG/DL
WBC # BLD AUTO: 16.32 K/UL (ref 4.5–11)

## 2023-04-14 PROCEDURE — 3008F PR BODY MASS INDEX (BMI) DOCUMENTED: ICD-10-PCS | Mod: ,,, | Performed by: NURSE PRACTITIONER

## 2023-04-14 PROCEDURE — 1160F RVW MEDS BY RX/DR IN RCRD: CPT | Mod: ,,, | Performed by: NURSE PRACTITIONER

## 2023-04-14 PROCEDURE — 80061 LIPID PANEL: ICD-10-PCS | Mod: ICN,,, | Performed by: CLINICAL MEDICAL LABORATORY

## 2023-04-14 PROCEDURE — 80050 GENERAL HEALTH PANEL: CPT | Mod: ICN,,, | Performed by: CLINICAL MEDICAL LABORATORY

## 2023-04-14 PROCEDURE — 96372 THER/PROPH/DIAG INJ SC/IM: CPT | Mod: ,,, | Performed by: NURSE PRACTITIONER

## 2023-04-14 PROCEDURE — 3075F SYST BP GE 130 - 139MM HG: CPT | Mod: ,,, | Performed by: NURSE PRACTITIONER

## 2023-04-14 PROCEDURE — 99214 OFFICE O/P EST MOD 30 MIN: CPT | Mod: 25,,, | Performed by: NURSE PRACTITIONER

## 2023-04-14 PROCEDURE — 3079F PR MOST RECENT DIASTOLIC BLOOD PRESSURE 80-89 MM HG: ICD-10-PCS | Mod: ,,, | Performed by: NURSE PRACTITIONER

## 2023-04-14 PROCEDURE — 99214 PR OFFICE/OUTPT VISIT, EST, LEVL IV, 30-39 MIN: ICD-10-PCS | Mod: 25,,, | Performed by: NURSE PRACTITIONER

## 2023-04-14 PROCEDURE — 3008F BODY MASS INDEX DOCD: CPT | Mod: ,,, | Performed by: NURSE PRACTITIONER

## 2023-04-14 PROCEDURE — 1159F PR MEDICATION LIST DOCUMENTED IN MEDICAL RECORD: ICD-10-PCS | Mod: ,,, | Performed by: NURSE PRACTITIONER

## 2023-04-14 PROCEDURE — 80050 PR  GENERAL HEALTH PANEL: ICD-10-PCS | Mod: ICN,,, | Performed by: CLINICAL MEDICAL LABORATORY

## 2023-04-14 PROCEDURE — 96372 PR INJECTION,THERAP/PROPH/DIAG2ST, IM OR SUBCUT: ICD-10-PCS | Mod: ,,, | Performed by: NURSE PRACTITIONER

## 2023-04-14 PROCEDURE — 3075F PR MOST RECENT SYSTOLIC BLOOD PRESS GE 130-139MM HG: ICD-10-PCS | Mod: ,,, | Performed by: NURSE PRACTITIONER

## 2023-04-14 PROCEDURE — 1159F MED LIST DOCD IN RCRD: CPT | Mod: ,,, | Performed by: NURSE PRACTITIONER

## 2023-04-14 PROCEDURE — 1160F PR REVIEW ALL MEDS BY PRESCRIBER/CLIN PHARMACIST DOCUMENTED: ICD-10-PCS | Mod: ,,, | Performed by: NURSE PRACTITIONER

## 2023-04-14 PROCEDURE — 3079F DIAST BP 80-89 MM HG: CPT | Mod: ,,, | Performed by: NURSE PRACTITIONER

## 2023-04-14 PROCEDURE — 80061 LIPID PANEL: CPT | Mod: ICN,,, | Performed by: CLINICAL MEDICAL LABORATORY

## 2023-04-14 RX ORDER — LOVASTATIN 10 MG/1
10 TABLET ORAL DAILY
Qty: 30 TABLET | Refills: 5 | Status: SHIPPED | OUTPATIENT
Start: 2023-04-14 | End: 2023-04-16 | Stop reason: ALTCHOICE

## 2023-04-14 RX ORDER — MELOXICAM 7.5 MG/1
15 TABLET ORAL DAILY
Qty: 60 TABLET | Refills: 5 | Status: SHIPPED | OUTPATIENT
Start: 2023-04-14 | End: 2023-12-05 | Stop reason: SDUPTHER

## 2023-04-14 RX ORDER — IPRATROPIUM BROMIDE 21 UG/1
2 SPRAY, METERED NASAL 2 TIMES DAILY
Qty: 30 ML | Refills: 0 | Status: SHIPPED | OUTPATIENT
Start: 2023-04-14 | End: 2023-12-05 | Stop reason: SDUPTHER

## 2023-04-14 RX ORDER — LEVOTHYROXINE SODIUM 150 UG/1
150 TABLET ORAL DAILY
Qty: 30 TABLET | Refills: 5 | Status: SHIPPED | OUTPATIENT
Start: 2023-04-14 | End: 2023-12-05 | Stop reason: SDUPTHER

## 2023-04-14 RX ORDER — FLUOXETINE HYDROCHLORIDE 20 MG/1
20 CAPSULE ORAL DAILY
Qty: 30 CAPSULE | Refills: 5 | Status: SHIPPED | OUTPATIENT
Start: 2023-04-14 | End: 2023-12-05

## 2023-04-14 RX ORDER — AZITHROMYCIN 250 MG/1
TABLET, FILM COATED ORAL
Qty: 6 TABLET | Refills: 0 | Status: SHIPPED | OUTPATIENT
Start: 2023-04-14 | End: 2023-04-19

## 2023-04-14 RX ORDER — AMLODIPINE BESYLATE 10 MG/1
10 TABLET ORAL DAILY
Qty: 30 TABLET | Refills: 5 | Status: SHIPPED | OUTPATIENT
Start: 2023-04-14 | End: 2023-12-05 | Stop reason: SDUPTHER

## 2023-04-14 RX ORDER — POTASSIUM CHLORIDE 750 MG/1
10 TABLET, EXTENDED RELEASE ORAL DAILY
Qty: 30 TABLET | Refills: 5 | Status: SHIPPED | OUTPATIENT
Start: 2023-04-14 | End: 2023-12-05 | Stop reason: SDUPTHER

## 2023-04-14 RX ORDER — HYDROCHLOROTHIAZIDE 25 MG/1
25 TABLET ORAL DAILY
Qty: 30 TABLET | Refills: 5 | Status: SHIPPED | OUTPATIENT
Start: 2023-04-14 | End: 2023-12-05 | Stop reason: SDUPTHER

## 2023-04-14 RX ORDER — CEFTRIAXONE 1 G/1
1 INJECTION, POWDER, FOR SOLUTION INTRAMUSCULAR; INTRAVENOUS
Status: COMPLETED | OUTPATIENT
Start: 2023-04-14 | End: 2023-04-14

## 2023-04-14 RX ORDER — DEXAMETHASONE SODIUM PHOSPHATE 4 MG/ML
4 INJECTION, SOLUTION INTRA-ARTICULAR; INTRALESIONAL; INTRAMUSCULAR; INTRAVENOUS; SOFT TISSUE
Status: COMPLETED | OUTPATIENT
Start: 2023-04-14 | End: 2023-04-14

## 2023-04-14 RX ADMIN — CEFTRIAXONE 1 G: 1 INJECTION, POWDER, FOR SOLUTION INTRAMUSCULAR; INTRAVENOUS at 09:04

## 2023-04-14 RX ADMIN — DEXAMETHASONE SODIUM PHOSPHATE 4 MG: 4 INJECTION, SOLUTION INTRA-ARTICULAR; INTRALESIONAL; INTRAMUSCULAR; INTRAVENOUS; SOFT TISSUE at 09:04

## 2023-04-14 NOTE — ASSESSMENT & PLAN NOTE
Rocephin and Decadron given IM in clinic.   Zithromax and Atrovent as ordered.     Reviewed pathology of current symptoms, medication side effects/risk/benefits/directions on taking medications, and worsening or persistent symptoms that require follow up in next 2-3 days. Saline/steroid nasal sprays, antihistamine use, increase fluid intake, and multivitamin/elderberry/Zinc use were recommended. May take Tylenol or Motrin as needed for pain and/or fever. Patient was instructed to take antibiotic as directed, complete entire course to avoid antibiotic resistance, and take OTC probiotic with antibiotic to prevent GI upset. Patient verbalized understanding of treatment plan and denies any questions.

## 2023-04-14 NOTE — ASSESSMENT & PLAN NOTE
Blood pressure elevated today in clinic. However benitez reports she has not yet taken medication this morning. Instructed to take medications daily as ordered. We will continue current medications.  Patient is to monitor blood pressure at home. Goal is 120/80, if stays elevated she will need to return to clinic for med adjustments. Otherwise follow up in 3 months or as needed.

## 2023-04-14 NOTE — ASSESSMENT & PLAN NOTE
Clinically euthyroid. TSH obtained today. Will follow up with results and make med adjustments as needed.

## 2023-04-14 NOTE — PROGRESS NOTES
Patti Gaitan NP   [facility] [id number]  [place]  [address]      PATIENT NAME: Elif Romo  : 1975  DATE: 23  MRN: [id number]      Billing Provider: Patti Gaitan NP  Level of Service: PA OFFICE/OUTPT VISIT, EST, LEVL IV, 30-39 MIN  Patient PCP Information       Provider PCP Type    Patti Gaitan NP General            Reason for Visit / Chief Complaint: Medication Refill, Sinus Problem (X2 month. Sinus drainage and pressure behind the eyes. ), and Care Gaps (Declined mammo, CCS, and Covid booster.)         History of Present Illness / Problem Focused Workflow     Elif Romo presents to the clinic with Medication Refill, Sinus Problem (X2 month. Sinus drainage and pressure behind the eyes. ), and Care Gaps (Declined mammo, CCS, and Covid booster.)     47 year old female presents to clinic for check up and refill of medications. She also reports some sinus congestion and drainage and pressure behind her eyes that she reports started over a month ago. Denies fever. Denies known sick contacts.       Review of Systems     @Review of Systems   Constitutional:  Negative for activity change, appetite change, fatigue and fever.   HENT:  Positive for nasal congestion, rhinorrhea and sinus pressure/congestion. Negative for ear pain and sore throat.    Eyes:  Negative for pain, redness, visual disturbance and eye dryness.   Respiratory:  Positive for cough. Negative for shortness of breath.    Cardiovascular:  Negative for chest pain and leg swelling.   Gastrointestinal:  Negative for abdominal distention, abdominal pain, constipation and diarrhea.   Endocrine: Negative for cold intolerance, heat intolerance and polyuria.   Genitourinary:  Negative for bladder incontinence, dysuria, frequency and urgency.   Musculoskeletal:  Negative for arthralgias, gait problem and myalgias.   Integumentary:  Negative for color change, rash and wound.   Allergic/Immunologic: Negative  for environmental allergies and food allergies.   Neurological:  Negative for dizziness, weakness, light-headedness and headaches.   Psychiatric/Behavioral:  Negative for behavioral problems and sleep disturbance.      Medical / Social / Family History     Past Medical History:   Diagnosis Date    Anxiety     Depression     Hyperlipidemia     Hypertension     Hypothyroidism        Past Surgical History:   Procedure Laterality Date    TOTAL THYROIDECTOMY  01/2018           Social History  Ms.  reports that she has never smoked. She has never used smokeless tobacco. She reports current alcohol use. She reports that she does not use drugs.    Family History  Ms.'s family history includes Asthma in her daughter; Depression in her mother; Diabetes in her father; Heart disease in her mother; Hypertension in her father, maternal grandfather, and mother.    Medications and Allergies     Medications  Outpatient Medications Marked as Taking for the 4/14/23 encounter (Office Visit) with Patti Gaitan NP   Medication Sig Dispense Refill    mv-min/iron/folic/calcium/vitK (WOMEN'S MULTIVITAMIN ORAL) Take 1 tablet by mouth once daily.      [DISCONTINUED] amLODIPine (NORVASC) 10 MG tablet Take 1 tablet (10 mg total) by mouth once daily. 30 tablet 5    [DISCONTINUED] FLUoxetine 20 MG capsule Take 1 capsule (20 mg total) by mouth once daily. 30 capsule 5    [DISCONTINUED] hydroCHLOROthiazide (HYDRODIURIL) 25 MG tablet Take 1 tablet (25 mg total) by mouth once daily. 30 tablet 5    [DISCONTINUED] levothyroxine (EUTHYROX) 150 MCG tablet Take 1 tablet (150 mcg total) by mouth once daily. 30 tablet 5    [DISCONTINUED] lovastatin (MEVACOR) 10 MG tablet Take 1 tablet (10 mg total) by mouth once daily. 30 tablet 5    [DISCONTINUED] meloxicam (MOBIC) 7.5 MG tablet Take 2 tablets (15 mg total) by mouth once daily. 60 tablet 5    [DISCONTINUED] potassium chloride (KLOR-CON) 10 MEQ TbSR Take 1 tablet (10 mEq total) by mouth once  daily. 30 tablet 5     Current Facility-Administered Medications for the 4/14/23 encounter (Office Visit) with Patti Gaitan NP   Medication Dose Route Frequency Provider Last Rate Last Admin    [COMPLETED] cefTRIAXone injection 1 g  1 g Intramuscular 1 time in Clinic/HOD Patti Gaitan NP   1 g at 04/14/23 0920    [COMPLETED] dexAMETHasone injection 4 mg  4 mg Intramuscular 1 time in Clinic/HOD Patti Gaitan NP   4 mg at 04/14/23 0920       Allergies  Review of patient's allergies indicates:   Allergen Reactions    Bactrim [sulfamethoxazole-trimethoprim] Hives    Ceclor [cefaclor] Hives       Physical Examination     Vitals:    04/14/23 1136   BP: 138/84   Pulse:    Resp:    Temp:      Physical Exam  Vitals and nursing note reviewed.   Constitutional:       Appearance: Normal appearance. She is obese.   HENT:      Head: Normocephalic.      Right Ear: Tympanic membrane normal.      Left Ear: Tympanic membrane normal.      Nose: Congestion and rhinorrhea present. Rhinorrhea is purulent.      Right Turbinates: Pale.      Left Turbinates: Pale.      Right Sinus: Maxillary sinus tenderness and frontal sinus tenderness present.      Left Sinus: Maxillary sinus tenderness and frontal sinus tenderness present.      Mouth/Throat:      Lips: Pink.      Mouth: Mucous membranes are moist.      Pharynx: Oropharynx is clear. No posterior oropharyngeal erythema.   Eyes:      Conjunctiva/sclera: Conjunctivae normal.   Cardiovascular:      Rate and Rhythm: Normal rate and regular rhythm.      Pulses: Normal pulses.      Heart sounds: Normal heart sounds.   Pulmonary:      Effort: Pulmonary effort is normal.      Breath sounds: Normal breath sounds. No wheezing or rhonchi.   Abdominal:      General: Abdomen is flat. Bowel sounds are normal. There is no distension.      Palpations: Abdomen is soft.      Tenderness: There is no abdominal tenderness.   Musculoskeletal:         General: No swelling or tenderness. Normal range  of motion.      Cervical back: Normal range of motion.      Right lower leg: No edema.      Left lower leg: No edema.   Skin:     General: Skin is warm and dry.      Capillary Refill: Capillary refill takes less than 2 seconds.   Neurological:      General: No focal deficit present.      Mental Status: She is alert and oriented to person, place, and time. Mental status is at baseline.   Psychiatric:         Mood and Affect: Mood normal.         Behavior: Behavior normal.             Lab Results   Component Value Date    WBC 12.19 (H) 09/06/2022    HGB 13.6 09/06/2022    HCT 41.5 09/06/2022    MCV 88.1 09/06/2022     09/06/2022        CMP  Sodium   Date Value Ref Range Status   09/06/2022 140 136 - 145 mmol/L Final     Potassium   Date Value Ref Range Status   09/06/2022 3.2 (L) 3.5 - 5.1 mmol/L Final     Chloride   Date Value Ref Range Status   09/06/2022 104 98 - 107 mmol/L Final     CO2   Date Value Ref Range Status   09/06/2022 28 21 - 32 mmol/L Final     Glucose   Date Value Ref Range Status   09/06/2022 105 74 - 106 mg/dL Final     BUN   Date Value Ref Range Status   09/06/2022 17 7 - 18 mg/dL Final     Creatinine   Date Value Ref Range Status   09/06/2022 2.36 (H) 0.55 - 1.02 mg/dL Final     Calcium   Date Value Ref Range Status   09/06/2022 7.7 (L) 8.5 - 10.1 mg/dL Final     Total Protein   Date Value Ref Range Status   09/06/2022 7.7 6.4 - 8.2 g/dL Final     Albumin   Date Value Ref Range Status   09/06/2022 3.6 3.5 - 5.0 g/dL Final     Bilirubin, Total   Date Value Ref Range Status   09/06/2022 0.3 >0.0 - 1.2 mg/dL Final     Alk Phos   Date Value Ref Range Status   09/06/2022 66 39 - 100 U/L Final     AST   Date Value Ref Range Status   09/06/2022 21 15 - 37 U/L Final     ALT   Date Value Ref Range Status   09/06/2022 19 13 - 56 U/L Final     Anion Gap   Date Value Ref Range Status   09/06/2022 11 7 - 16 mmol/L Final     eGFR   Date Value Ref Range Status   09/06/2022 25 (L) >=60 mL/min/1.73m² Final      Procedures   Assessment and Plan (including Health Maintenance)   :    Plan:           Problem List Items Addressed This Visit          Psychiatric    Depression    Current Assessment & Plan     Well controlled on Fluoxetine. Continue at current dosage. Follow up in 3 months or as needed.            Relevant Medications    FLUoxetine 20 MG capsule       ENT    Acute non-recurrent pansinusitis    Current Assessment & Plan     Rocephin and Decadron given IM in clinic.   Zithromax and Atrovent as ordered.     Reviewed pathology of current symptoms, medication side effects/risk/benefits/directions on taking medications, and worsening or persistent symptoms that require follow up in next 2-3 days. Saline/steroid nasal sprays, antihistamine use, increase fluid intake, and multivitamin/elderberry/Zinc use were recommended. May take Tylenol or Motrin as needed for pain and/or fever. Patient was instructed to take antibiotic as directed, complete entire course to avoid antibiotic resistance, and take OTC probiotic with antibiotic to prevent GI upset. Patient verbalized understanding of treatment plan and denies any questions.         Relevant Medications    azithromycin (Z-REGAN) 250 MG tablet    ipratropium (ATROVENT) 21 mcg (0.03 %) nasal spray    cefTRIAXone injection 1 g (Completed)    dexAMETHasone injection 4 mg (Completed)       Cardiac/Vascular    Hypertension - Primary    Current Assessment & Plan     Blood pressure elevated today in clinic. However benitez reports she has not yet taken medication this morning. Instructed to take medications daily as ordered. We will continue current medications.  Patient is to monitor blood pressure at home. Goal is 120/80, if stays elevated she will need to return to clinic for med adjustments. Otherwise follow up in 3 months or as needed.              Relevant Medications    amLODIPine (NORVASC) 10 MG tablet    hydroCHLOROthiazide (HYDRODIURIL) 25 MG tablet    potassium  chloride (KLOR-CON) 10 MEQ TbSR    Other Relevant Orders    Comprehensive Metabolic Panel    CBC Auto Differential    Hyperlipidemia    Current Assessment & Plan     Lipid panel obtained at today's visit. Goal LDL is less than 70. Continue current meds and low fat/low cholesterol diet with increased exercise as tolerated. Will follow up with labs. Patient to follow up in 3 months or as needed.              Relevant Medications    lovastatin (MEVACOR) 10 MG tablet    Other Relevant Orders    Lipid Panel       Endocrine    Hypothyroidism    Current Assessment & Plan     Clinically euthyroid. TSH obtained today. Will follow up with results and make med adjustments as needed.            Relevant Medications    levothyroxine (EUTHYROX) 150 MCG tablet    Other Relevant Orders    TSH       Orthopedic    Chronic back pain    Current Assessment & Plan     Well controlled on Mobic. Continue at current dosage. Follow up as needed           Relevant Medications    meloxicam (MOBIC) 7.5 MG tablet       Health Maintenance Topics with due status: Not Due       Topic Last Completion Date    Lipid Panel 09/06/2022       No future appointments.     Health Maintenance Due   Topic Date Due    Cervical Cancer Screening  Never done    TETANUS VACCINE  Never done    Mammogram  Never done    Hemoglobin A1c (Diabetic Prevention Screening)  Never done    COVID-19 Vaccine (3 - Booster for Moderna series) 10/01/2021        No follow-ups on file.     Signature:  Patti Gaitan NP  [facility]  [address] [place]  [address]    Date of encounter: 4/14/23

## 2023-04-16 DIAGNOSIS — N18.4 KIDNEY DISEASE, CHRONIC, STAGE IV (GFR 15-29 ML/MIN): Primary | ICD-10-CM

## 2023-04-16 RX ORDER — ROSUVASTATIN CALCIUM 10 MG/1
10 TABLET, COATED ORAL DAILY
Qty: 90 TABLET | Refills: 3 | Status: SHIPPED | OUTPATIENT
Start: 2023-04-16 | End: 2023-12-05 | Stop reason: SDUPTHER

## 2023-04-17 NOTE — PROGRESS NOTES
Labs reviewed: Cholesterol was elevated. She has been taking Lovastatin but we will need to change her to higher intensity statin. Stop Lovastatin and start Rosuvastatin 10mg nightly. Follow low fat/low cholesterol diet.   Potassium was slightly low. Make sure she is taking Potassium 10meq daily as ordered.   Her labs indicate kidney disease- her eGFR was very low on recent labs as well as labs done 7 months ago. There was no diagnosis of this on her chart. We need to get her in to see Nephrologist if she is not already seeing someone. Please talk to her and let me know.

## 2023-04-25 DIAGNOSIS — N18.4 KIDNEY DISEASE, CHRONIC, STAGE IV (GFR 15-29 ML/MIN): Primary | ICD-10-CM

## 2023-07-17 PROBLEM — J01.40 ACUTE NON-RECURRENT PANSINUSITIS: Status: RESOLVED | Noted: 2023-04-14 | Resolved: 2023-07-17

## 2023-07-21 ENCOUNTER — PATIENT MESSAGE (OUTPATIENT)
Dept: ADMINISTRATIVE | Facility: HOSPITAL | Age: 48
End: 2023-07-21

## 2023-12-05 ENCOUNTER — OFFICE VISIT (OUTPATIENT)
Dept: FAMILY MEDICINE | Facility: CLINIC | Age: 48
End: 2023-12-05
Payer: COMMERCIAL

## 2023-12-05 VITALS
BODY MASS INDEX: 50.41 KG/M2 | RESPIRATION RATE: 16 BRPM | HEIGHT: 61 IN | DIASTOLIC BLOOD PRESSURE: 76 MMHG | TEMPERATURE: 98 F | WEIGHT: 267 LBS | HEART RATE: 88 BPM | OXYGEN SATURATION: 97 % | SYSTOLIC BLOOD PRESSURE: 136 MMHG

## 2023-12-05 DIAGNOSIS — I10 PRIMARY HYPERTENSION: ICD-10-CM

## 2023-12-05 DIAGNOSIS — E89.0 POSTOPERATIVE HYPOTHYROIDISM: Primary | ICD-10-CM

## 2023-12-05 DIAGNOSIS — N18.4 STAGE 4 CHRONIC KIDNEY DISEASE: ICD-10-CM

## 2023-12-05 DIAGNOSIS — M54.9 CHRONIC BACK PAIN, UNSPECIFIED BACK LOCATION, UNSPECIFIED BACK PAIN LATERALITY: ICD-10-CM

## 2023-12-05 DIAGNOSIS — G89.29 CHRONIC BACK PAIN, UNSPECIFIED BACK LOCATION, UNSPECIFIED BACK PAIN LATERALITY: ICD-10-CM

## 2023-12-05 DIAGNOSIS — E78.2 MIXED HYPERLIPIDEMIA: ICD-10-CM

## 2023-12-05 DIAGNOSIS — J01.40 ACUTE NON-RECURRENT PANSINUSITIS: ICD-10-CM

## 2023-12-05 DIAGNOSIS — E03.0 CONGENITAL HYPOTHYROIDISM WITH DIFFUSE GOITER: ICD-10-CM

## 2023-12-05 DIAGNOSIS — F32.A DEPRESSION, UNSPECIFIED DEPRESSION TYPE: ICD-10-CM

## 2023-12-05 LAB
ALBUMIN SERPL BCP-MCNC: 3.7 G/DL (ref 3.5–5)
ALBUMIN/GLOB SERPL: 0.8 {RATIO}
ALP SERPL-CCNC: 73 U/L (ref 39–100)
ALT SERPL W P-5'-P-CCNC: 27 U/L (ref 13–56)
ANION GAP SERPL CALCULATED.3IONS-SCNC: 11 MMOL/L (ref 7–16)
AST SERPL W P-5'-P-CCNC: 23 U/L (ref 15–37)
BASOPHILS # BLD AUTO: 0.08 K/UL (ref 0–0.2)
BASOPHILS NFR BLD AUTO: 0.6 % (ref 0–1)
BILIRUB SERPL-MCNC: 0.4 MG/DL (ref ?–1.2)
BUN SERPL-MCNC: 13 MG/DL (ref 7–18)
BUN/CREAT SERPL: 4 (ref 6–20)
CALCIUM SERPL-MCNC: 6.9 MG/DL (ref 8.5–10.1)
CHLORIDE SERPL-SCNC: 106 MMOL/L (ref 98–107)
CHOLEST SERPL-MCNC: 239 MG/DL (ref 0–200)
CHOLEST/HDLC SERPL: 3.8 {RATIO}
CO2 SERPL-SCNC: 27 MMOL/L (ref 21–32)
CREAT SERPL-MCNC: 3.2 MG/DL (ref 0.55–1.02)
DIFFERENTIAL METHOD BLD: ABNORMAL
EGFR (NO RACE VARIABLE) (RUSH/TITUS): 17 ML/MIN/1.73M2
EOSINOPHIL # BLD AUTO: 0.35 K/UL (ref 0–0.5)
EOSINOPHIL NFR BLD AUTO: 2.6 % (ref 1–4)
ERYTHROCYTE [DISTWIDTH] IN BLOOD BY AUTOMATED COUNT: 15 % (ref 11.5–14.5)
GLOBULIN SER-MCNC: 4.6 G/DL (ref 2–4)
GLUCOSE SERPL-MCNC: 60 MG/DL (ref 74–106)
HCT VFR BLD AUTO: 42.1 % (ref 38–47)
HDLC SERPL-MCNC: 63 MG/DL (ref 40–60)
HGB BLD-MCNC: 13.8 G/DL (ref 12–16)
IMM GRANULOCYTES # BLD AUTO: 0.04 K/UL (ref 0–0.04)
IMM GRANULOCYTES NFR BLD: 0.3 % (ref 0–0.4)
LDLC SERPL CALC-MCNC: 138 MG/DL
LDLC/HDLC SERPL: 2.2 {RATIO}
LYMPHOCYTES # BLD AUTO: 3.28 K/UL (ref 1–4.8)
LYMPHOCYTES NFR BLD AUTO: 24.1 % (ref 27–41)
MCH RBC QN AUTO: 29.2 PG (ref 27–31)
MCHC RBC AUTO-ENTMCNC: 32.8 G/DL (ref 32–36)
MCV RBC AUTO: 89.2 FL (ref 80–96)
MONOCYTES # BLD AUTO: 0.68 K/UL (ref 0–0.8)
MONOCYTES NFR BLD AUTO: 5 % (ref 2–6)
MPC BLD CALC-MCNC: 10.3 FL (ref 9.4–12.4)
NEUTROPHILS # BLD AUTO: 9.16 K/UL (ref 1.8–7.7)
NEUTROPHILS NFR BLD AUTO: 67.4 % (ref 53–65)
NONHDLC SERPL-MCNC: 176 MG/DL
NRBC # BLD AUTO: 0 X10E3/UL
NRBC, AUTO (.00): 0 %
PLATELET # BLD AUTO: 359 K/UL (ref 150–400)
POTASSIUM SERPL-SCNC: 3.6 MMOL/L (ref 3.5–5.1)
PROT SERPL-MCNC: 8.3 G/DL (ref 6.4–8.2)
RBC # BLD AUTO: 4.72 M/UL (ref 4.2–5.4)
SODIUM SERPL-SCNC: 140 MMOL/L (ref 136–145)
TRIGL SERPL-MCNC: 189 MG/DL (ref 35–150)
TSH SERPL DL<=0.005 MIU/L-ACNC: 40.9 UIU/ML (ref 0.36–3.74)
VLDLC SERPL-MCNC: 38 MG/DL
WBC # BLD AUTO: 13.59 K/UL (ref 4.5–11)

## 2023-12-05 PROCEDURE — 1160F PR REVIEW ALL MEDS BY PRESCRIBER/CLIN PHARMACIST DOCUMENTED: ICD-10-PCS | Mod: ,,, | Performed by: NURSE PRACTITIONER

## 2023-12-05 PROCEDURE — 3008F PR BODY MASS INDEX (BMI) DOCUMENTED: ICD-10-PCS | Mod: ,,, | Performed by: NURSE PRACTITIONER

## 2023-12-05 PROCEDURE — 3075F PR MOST RECENT SYSTOLIC BLOOD PRESS GE 130-139MM HG: ICD-10-PCS | Mod: ,,, | Performed by: NURSE PRACTITIONER

## 2023-12-05 PROCEDURE — 99214 PR OFFICE/OUTPT VISIT, EST, LEVL IV, 30-39 MIN: ICD-10-PCS | Mod: ,,, | Performed by: NURSE PRACTITIONER

## 2023-12-05 PROCEDURE — 1160F RVW MEDS BY RX/DR IN RCRD: CPT | Mod: ,,, | Performed by: NURSE PRACTITIONER

## 2023-12-05 PROCEDURE — 80050 PR  GENERAL HEALTH PANEL: ICD-10-PCS | Mod: ICN,,, | Performed by: CLINICAL MEDICAL LABORATORY

## 2023-12-05 PROCEDURE — 80050 GENERAL HEALTH PANEL: CPT | Mod: ICN,,, | Performed by: CLINICAL MEDICAL LABORATORY

## 2023-12-05 PROCEDURE — 3075F SYST BP GE 130 - 139MM HG: CPT | Mod: ,,, | Performed by: NURSE PRACTITIONER

## 2023-12-05 PROCEDURE — 3008F BODY MASS INDEX DOCD: CPT | Mod: ,,, | Performed by: NURSE PRACTITIONER

## 2023-12-05 PROCEDURE — 1159F MED LIST DOCD IN RCRD: CPT | Mod: ,,, | Performed by: NURSE PRACTITIONER

## 2023-12-05 PROCEDURE — 99214 OFFICE O/P EST MOD 30 MIN: CPT | Mod: ,,, | Performed by: NURSE PRACTITIONER

## 2023-12-05 PROCEDURE — 3078F PR MOST RECENT DIASTOLIC BLOOD PRESSURE < 80 MM HG: ICD-10-PCS | Mod: ,,, | Performed by: NURSE PRACTITIONER

## 2023-12-05 PROCEDURE — 80061 LIPID PANEL: ICD-10-PCS | Mod: ICN,,, | Performed by: CLINICAL MEDICAL LABORATORY

## 2023-12-05 PROCEDURE — 1159F PR MEDICATION LIST DOCUMENTED IN MEDICAL RECORD: ICD-10-PCS | Mod: ,,, | Performed by: NURSE PRACTITIONER

## 2023-12-05 PROCEDURE — 3078F DIAST BP <80 MM HG: CPT | Mod: ,,, | Performed by: NURSE PRACTITIONER

## 2023-12-05 PROCEDURE — 80061 LIPID PANEL: CPT | Mod: ICN,,, | Performed by: CLINICAL MEDICAL LABORATORY

## 2023-12-05 RX ORDER — HYDROCHLOROTHIAZIDE 25 MG/1
25 TABLET ORAL DAILY
Qty: 90 TABLET | Refills: 1 | Status: SHIPPED | OUTPATIENT
Start: 2023-12-05

## 2023-12-05 RX ORDER — FLUOXETINE HYDROCHLORIDE 40 MG/1
40 CAPSULE ORAL DAILY
Qty: 30 CAPSULE | Refills: 5 | Status: SHIPPED | OUTPATIENT
Start: 2023-12-05 | End: 2024-06-02

## 2023-12-05 RX ORDER — HYDROXYZINE PAMOATE 50 MG/1
50 CAPSULE ORAL EVERY 8 HOURS PRN
Qty: 90 CAPSULE | Refills: 0 | Status: SHIPPED | OUTPATIENT
Start: 2023-12-05 | End: 2024-02-08 | Stop reason: SDUPTHER

## 2023-12-05 RX ORDER — KETOROLAC TROMETHAMINE 30 MG/ML
30 INJECTION, SOLUTION INTRAMUSCULAR; INTRAVENOUS
Status: DISCONTINUED | OUTPATIENT
Start: 2023-12-05 | End: 2023-12-05

## 2023-12-05 RX ORDER — LEVOTHYROXINE SODIUM 150 UG/1
150 TABLET ORAL DAILY
Qty: 90 TABLET | Refills: 1 | Status: SHIPPED | OUTPATIENT
Start: 2023-12-05

## 2023-12-05 RX ORDER — ROSUVASTATIN CALCIUM 10 MG/1
10 TABLET, COATED ORAL DAILY
Qty: 90 TABLET | Refills: 1 | Status: SHIPPED | OUTPATIENT
Start: 2023-12-05

## 2023-12-05 RX ORDER — IPRATROPIUM BROMIDE 21 UG/1
2 SPRAY, METERED NASAL 2 TIMES DAILY
Qty: 30 ML | Refills: 5 | Status: SHIPPED | OUTPATIENT
Start: 2023-12-05

## 2023-12-05 RX ORDER — AMLODIPINE BESYLATE 10 MG/1
10 TABLET ORAL DAILY
Qty: 90 TABLET | Refills: 1 | Status: SHIPPED | OUTPATIENT
Start: 2023-12-05

## 2023-12-05 RX ORDER — TRAMADOL HYDROCHLORIDE 100 MG/1
100 TABLET, COATED ORAL EVERY 6 HOURS
Qty: 30 TABLET | Refills: 0 | Status: SHIPPED | OUTPATIENT
Start: 2023-12-05 | End: 2023-12-05 | Stop reason: CLARIF

## 2023-12-05 RX ORDER — POTASSIUM CHLORIDE 750 MG/1
10 TABLET, EXTENDED RELEASE ORAL DAILY
Qty: 90 TABLET | Refills: 1 | Status: SHIPPED | OUTPATIENT
Start: 2023-12-05

## 2023-12-05 RX ORDER — FLUOXETINE HYDROCHLORIDE 20 MG/1
20 CAPSULE ORAL DAILY
Qty: 90 CAPSULE | Refills: 1 | Status: CANCELLED | OUTPATIENT
Start: 2023-12-05

## 2023-12-05 RX ORDER — AZITHROMYCIN 250 MG/1
TABLET, FILM COATED ORAL
Qty: 6 TABLET | Refills: 0 | Status: SHIPPED | OUTPATIENT
Start: 2023-12-05 | End: 2023-12-10

## 2023-12-05 RX ORDER — MELOXICAM 7.5 MG/1
15 TABLET ORAL DAILY
Qty: 180 TABLET | Refills: 1 | Status: SHIPPED | OUTPATIENT
Start: 2023-12-05 | End: 2023-12-07 | Stop reason: SINTOL

## 2023-12-05 NOTE — PROGRESS NOTES
Patti Gaitan NP   [place] [address] [facility] [address]  [place]  [address]      PATIENT NAME: Elif Romo  : 1975  DATE: 23  MRN: [id number]      Billing Provider: Patti Gaitan NP  Level of Service: AK OFFICE/OUTPT VISIT, EST, LEVL IV, 30-39 MIN  Patient PCP Information       Provider PCP Type    Patti Gaitan NP General            Reason for Visit / Chief Complaint: Hyperlipidemia (Routine visit for medication refill and labs. ), Hypertension (Routine visit for medication refill and labs. /), Hypothyroidism (Routine visit for medication refill and labs. /), Depression (Routine visit for medication refill. ), and Back Pain (Routine visit for follow up on chronic back pain and medication refill. )         History of Present Illness / Problem Focused Workflow     48 year old female presents to clinic for check up and medication refill. She states she continues to have lower back pain worsened with activity but reports this is chronic for her. She does complain of some sinus pressure and congestion that started 2 days ago. Denies cough. Denies known sick contacts. Denies fever.         Review of Systems     @Review of Systems   Constitutional:  Negative for activity change and unexpected weight change.   HENT:  Positive for nasal congestion, rhinorrhea and sinus pressure/congestion. Negative for hearing loss and trouble swallowing.    Eyes:  Positive for discharge. Negative for visual disturbance.   Respiratory:  Negative for chest tightness and wheezing.    Cardiovascular:  Negative for chest pain and palpitations.   Gastrointestinal:  Positive for constipation. Negative for blood in stool, diarrhea and vomiting.   Endocrine: Negative for polydipsia and polyuria.   Genitourinary:  Negative for difficulty urinating, dysuria, hematuria and menstrual problem.   Musculoskeletal:  Positive for arthralgias and back pain. Negative for joint swelling and neck pain.   Neurological:   Positive for headaches. Negative for weakness.   Psychiatric/Behavioral:  Negative for confusion and dysphoric mood.        Medical / Social / Family History     Past Medical History:   Diagnosis Date    Anxiety     Depression     Hyperlipidemia     Hypertension     Hypothyroidism        Past Surgical History:   Procedure Laterality Date    TOTAL THYROIDECTOMY  01/2018           Medications and Allergies     Medications  Outpatient Medications Marked as Taking for the 12/5/23 encounter (Office Visit) with Patti Gaitan NP   Medication Sig Dispense Refill    mv-min/iron/folic/calcium/vitK (WOMEN'S MULTIVITAMIN ORAL) Take 1 tablet by mouth once daily.      [DISCONTINUED] amLODIPine (NORVASC) 10 MG tablet Take 1 tablet (10 mg total) by mouth once daily. 30 tablet 5    [DISCONTINUED] FLUoxetine 20 MG capsule Take 1 capsule (20 mg total) by mouth once daily. 30 capsule 5    [DISCONTINUED] hydroCHLOROthiazide (HYDRODIURIL) 25 MG tablet Take 1 tablet (25 mg total) by mouth once daily. 30 tablet 5    [DISCONTINUED] ipratropium (ATROVENT) 21 mcg (0.03 %) nasal spray 2 sprays by Each Nostril route 2 (two) times daily. 30 mL 0    [DISCONTINUED] levothyroxine (EUTHYROX) 150 MCG tablet Take 1 tablet (150 mcg total) by mouth once daily. 30 tablet 5    [DISCONTINUED] meloxicam (MOBIC) 7.5 MG tablet Take 2 tablets (15 mg total) by mouth once daily. 60 tablet 5    [DISCONTINUED] potassium chloride (KLOR-CON) 10 MEQ TbSR Take 1 tablet (10 mEq total) by mouth once daily. 30 tablet 5    [DISCONTINUED] rosuvastatin (CRESTOR) 10 MG tablet Take 1 tablet (10 mg total) by mouth once daily. 90 tablet 3       Allergies  Review of patient's allergies indicates:   Allergen Reactions    Bactrim [sulfamethoxazole-trimethoprim] Hives    Ceclor [cefaclor] Hives       Physical Examination     Vitals:    12/05/23 1134   BP: 136/76   Pulse: 88   Resp: 16   Temp: 98.4 °F (36.9 °C)     Physical Exam  Vitals and nursing note reviewed.    Constitutional:       Appearance: Normal appearance.   HENT:      Head: Normocephalic.      Right Ear: Tympanic membrane normal.      Left Ear: Tympanic membrane normal.      Nose: Congestion and rhinorrhea present. Rhinorrhea is purulent.      Right Turbinates: Pale.      Left Turbinates: Pale.      Right Sinus: Maxillary sinus tenderness and frontal sinus tenderness present.      Left Sinus: Maxillary sinus tenderness and frontal sinus tenderness present.      Mouth/Throat:      Lips: Pink.      Mouth: Mucous membranes are moist.      Pharynx: Oropharyngeal exudate (clear post nasal drainage.) and posterior oropharyngeal erythema present.   Eyes:      Conjunctiva/sclera: Conjunctivae normal.   Cardiovascular:      Rate and Rhythm: Normal rate and regular rhythm.      Pulses: Normal pulses.      Heart sounds: Normal heart sounds.   Pulmonary:      Effort: Pulmonary effort is normal.      Breath sounds: Normal breath sounds. No wheezing or rhonchi.   Abdominal:      General: Abdomen is flat. Bowel sounds are normal. There is no distension.      Palpations: Abdomen is soft.      Tenderness: There is no abdominal tenderness.   Musculoskeletal:         General: Normal range of motion.      Cervical back: Normal range of motion.      Thoracic back: Tenderness and bony tenderness present.   Skin:     General: Skin is warm and dry.      Capillary Refill: Capillary refill takes less than 2 seconds.   Neurological:      General: No focal deficit present.      Mental Status: She is alert and oriented to person, place, and time. Mental status is at baseline.   Psychiatric:         Mood and Affect: Mood normal.         Behavior: Behavior normal.               Lab Results   Component Value Date    WBC 13.59 (H) 12/05/2023    HGB 13.8 12/05/2023    HCT 42.1 12/05/2023    MCV 89.2 12/05/2023     12/05/2023        CMP  Sodium   Date Value Ref Range Status   12/05/2023 140 136 - 145 mmol/L Final     Potassium   Date Value  Ref Range Status   12/05/2023 3.6 3.5 - 5.1 mmol/L Final     Chloride   Date Value Ref Range Status   12/05/2023 106 98 - 107 mmol/L Final     CO2   Date Value Ref Range Status   12/05/2023 27 21 - 32 mmol/L Final     Glucose   Date Value Ref Range Status   12/05/2023 60 (L) 74 - 106 mg/dL Final     BUN   Date Value Ref Range Status   12/05/2023 13 7 - 18 mg/dL Final     Creatinine   Date Value Ref Range Status   12/05/2023 3.20 (H) 0.55 - 1.02 mg/dL Final     Calcium   Date Value Ref Range Status   12/05/2023 6.9 (LL) 8.5 - 10.1 mg/dL Final     Total Protein   Date Value Ref Range Status   12/05/2023 8.3 (H) 6.4 - 8.2 g/dL Final     Albumin   Date Value Ref Range Status   12/05/2023 3.7 3.5 - 5.0 g/dL Final     Bilirubin, Total   Date Value Ref Range Status   12/05/2023 0.4 >0.0 - 1.2 mg/dL Final     Alk Phos   Date Value Ref Range Status   12/05/2023 73 39 - 100 U/L Final     AST   Date Value Ref Range Status   12/05/2023 23 15 - 37 U/L Final     ALT   Date Value Ref Range Status   12/05/2023 27 13 - 56 U/L Final     Anion Gap   Date Value Ref Range Status   12/05/2023 11 7 - 16 mmol/L Final     eGFR   Date Value Ref Range Status   12/05/2023 17 (L) >=60 mL/min/1.73m2 Final     Procedures   Assessment and Plan (including Health Maintenance)   :    Plan:     Problem List Items Addressed This Visit          Psychiatric    Depression    Current Assessment & Plan     Well controlled on Fluoxetine. Continue at current dosage. Follow up in 3 months or as needed.          Relevant Medications    FLUoxetine 40 MG capsule       ENT    Acute non-recurrent pansinusitis    Current Assessment & Plan     Zithromax as ordered and Ed a Hist and Atrovent as needed.   Reviewed pathology of current symptoms, medication side effects/risk/benefits/directions on taking medications, and worsening or persistent symptoms that require follow up in next 2-3 days. Saline/steroid nasal sprays, antihistamine use, increase fluid intake, and  multivitamin/elderberry/Zinc use were recommended. May take Tylenol or Motrin as needed for pain and/or fever. Patient was instructed to take antibiotic as directed, complete entire course to avoid antibiotic resistance, and take OTC probiotic with antibiotic to prevent GI upset. Patient verbalized understanding of treatment plan and denies any questions.         Relevant Medications    ipratropium (ATROVENT) 21 mcg (0.03 %) nasal spray       Cardiac/Vascular    Hypertension    Current Assessment & Plan     Blood pressure well controlled. Continue current medications. Follow up in 3 months or as needed.              Relevant Medications    amLODIPine (NORVASC) 10 MG tablet    hydroCHLOROthiazide (HYDRODIURIL) 25 MG tablet    potassium chloride (KLOR-CON) 10 MEQ TbSR    Other Relevant Orders    CBC Auto Differential (Completed)    Comprehensive Metabolic Panel (Completed)    Hyperlipidemia    Current Assessment & Plan     Lipid panel obtained at today's visit. Goal LDL is less than 70. Continue current meds and low fat/low cholesterol diet with increased exercise as tolerated. Will follow up with labs. Patient to follow up in 3 months or as needed.            Relevant Medications    rosuvastatin (CRESTOR) 10 MG tablet    Other Relevant Orders    Lipid Panel (Completed)       Renal/    Stage 4 chronic kidney disease       Endocrine    Hypothyroidism - Primary    Current Assessment & Plan     Clinically euthyroid. TSH obtained today. Will follow up with results and make med adjustments as needed.          Relevant Medications    levothyroxine (EUTHYROX) 150 MCG tablet    Other Relevant Orders    TSH (Completed)       Orthopedic    Chronic back pain    Current Assessment & Plan     Toradol given IM in clinic. Well controlled on Mobic. Continue at current dosage. Follow up as needed            Health Maintenance Topics with due status: Not Due       Topic Last Completion Date    Lipid Panel 12/05/2023       Future  Appointments   Date Time Provider Department Center   1/16/2024  3:20 PM Patti Gaitan NP [facility]        Health Maintenance Due   Topic Date Due    Hepatitis C Screening  Never done    Cervical Cancer Screening  Never done    TETANUS VACCINE  Never done    Mammogram  Never done    Hemoglobin A1c (Diabetic Prevention Screening)  Never done    Colorectal Cancer Screening  Never done    COVID-19 Vaccine (3 - 2023-24 season) 09/01/2023          Signature:  Patti Gaitan NP  [address]  [address]    Date of encounter: 12/5/23

## 2023-12-07 DIAGNOSIS — N18.4 STAGE 4 CHRONIC KIDNEY DISEASE: Primary | ICD-10-CM

## 2023-12-07 NOTE — PROGRESS NOTES
Upon further review- her kidney levels were abnormal. Her eGFR indicates severe kidney disease. Please talk with her and see if she is already seeing a nephrologist- if she is not we need to refer her to one. She also needs to stop Mobic as it is damaging to her kidneys. She can take Tylenol as needed for pain.       I have discussed lab findings with patient and she is in agreement with plan. Labs reviewed: Calcium was critically low. Discussed with patient and she has not been taking calcium and vitamin d. Also has not been taking Synthroid for about 2 weeks. Instructed at length on taking medications as ordered and not letting herself run out. She verbalized understanding. She denies any s/sx of hypocalcemia such as paresthesias, numbness, muscle spasms or cramps. Cautioned that in extreme cases of hypocalcemia can cause seizures. She verbalized understanding and she states she will start back on her meds. We will recheck labs in 6-8 weeks.

## 2023-12-29 NOTE — ASSESSMENT & PLAN NOTE
Blood pressure well controlled. Continue current medications. Follow up in 3 months or as needed.

## 2023-12-29 NOTE — ASSESSMENT & PLAN NOTE
Toradol given IM in clinic. Well controlled on Mobic. Continue at current dosage. Follow up as needed

## 2023-12-29 NOTE — ASSESSMENT & PLAN NOTE
Zithromax as ordered and Ed a Hist and Atrovent as needed.   Reviewed pathology of current symptoms, medication side effects/risk/benefits/directions on taking medications, and worsening or persistent symptoms that require follow up in next 2-3 days. Saline/steroid nasal sprays, antihistamine use, increase fluid intake, and multivitamin/elderberry/Zinc use were recommended. May take Tylenol or Motrin as needed for pain and/or fever. Patient was instructed to take antibiotic as directed, complete entire course to avoid antibiotic resistance, and take OTC probiotic with antibiotic to prevent GI upset. Patient verbalized understanding of treatment plan and denies any questions.

## 2024-02-08 ENCOUNTER — OFFICE VISIT (OUTPATIENT)
Dept: FAMILY MEDICINE | Facility: CLINIC | Age: 49
End: 2024-02-08
Payer: COMMERCIAL

## 2024-02-08 VITALS
DIASTOLIC BLOOD PRESSURE: 84 MMHG | HEART RATE: 88 BPM | HEIGHT: 61 IN | WEIGHT: 262.38 LBS | TEMPERATURE: 98 F | RESPIRATION RATE: 20 BRPM | SYSTOLIC BLOOD PRESSURE: 134 MMHG | OXYGEN SATURATION: 97 % | BODY MASS INDEX: 49.54 KG/M2

## 2024-02-08 DIAGNOSIS — Z11.59 ENCOUNTER FOR HEPATITIS C SCREENING TEST FOR LOW RISK PATIENT: ICD-10-CM

## 2024-02-08 DIAGNOSIS — Z13.220 SCREENING FOR LIPID DISORDERS: ICD-10-CM

## 2024-02-08 DIAGNOSIS — Z13.1 SCREENING FOR DIABETES MELLITUS: ICD-10-CM

## 2024-02-08 DIAGNOSIS — Z00.00 ROUTINE GENERAL MEDICAL EXAMINATION AT A HEALTH CARE FACILITY: Primary | ICD-10-CM

## 2024-02-08 DIAGNOSIS — E03.0 CONGENITAL HYPOTHYROIDISM WITH DIFFUSE GOITER: ICD-10-CM

## 2024-02-08 PROCEDURE — 3079F DIAST BP 80-89 MM HG: CPT | Mod: ,,, | Performed by: NURSE PRACTITIONER

## 2024-02-08 PROCEDURE — 1159F MED LIST DOCD IN RCRD: CPT | Mod: ,,, | Performed by: NURSE PRACTITIONER

## 2024-02-08 PROCEDURE — 1160F RVW MEDS BY RX/DR IN RCRD: CPT | Mod: ,,, | Performed by: NURSE PRACTITIONER

## 2024-02-08 PROCEDURE — 3075F SYST BP GE 130 - 139MM HG: CPT | Mod: ,,, | Performed by: NURSE PRACTITIONER

## 2024-02-08 PROCEDURE — 3008F BODY MASS INDEX DOCD: CPT | Mod: ,,, | Performed by: NURSE PRACTITIONER

## 2024-02-08 PROCEDURE — 99396 PREV VISIT EST AGE 40-64: CPT | Mod: ,,, | Performed by: NURSE PRACTITIONER

## 2024-02-08 RX ORDER — HYDROXYZINE PAMOATE 50 MG/1
50 CAPSULE ORAL EVERY 8 HOURS PRN
Qty: 90 CAPSULE | Refills: 1 | Status: SHIPPED | OUTPATIENT
Start: 2024-02-08

## 2024-02-08 NOTE — PROGRESS NOTES
I discussed care gaps with patient. She would like to wait until she is 49 for the colonoscopy to be paid for by her insurance. Patient states she needs to be set up for a mammogram and cervical cancer screening. Patient refuses the covid vaccine. Patient does want the tetanus vaccine.

## 2024-02-09 ENCOUNTER — PATIENT OUTREACH (OUTPATIENT)
Dept: ADMINISTRATIVE | Facility: HOSPITAL | Age: 49
End: 2024-02-09

## 2024-02-09 NOTE — PROGRESS NOTES
Population Health Chart Review & Patient Outreach Details      Further Action Needed If Patient Returns Outreach:            Updates Requested / Reviewed:     []  Care Everywhere    []     []  External Sources ([facility], [facility], [facility], etc.)    [] [facility]   [] [facility]   [] Other:    []  Care Team Updated   []  Removed  or Duplicate Orders   []  Immunization Reconciliation Completed / Queried    [] [place]   [] [place]   [] [place]   [] [place]      Health Maintenance Topics Addressed and Outreach Outcomes / Actions Taken:             Breast Cancer Screening []  Mammogram Order Placed    []  Mammogram Screening Scheduled    []  External Records Requested & Care Team Updated if Applicable    []  External Records Uploaded & Care Team Updated if Applicable    []  Pt Declined Scheduling Mammogram    []  Pt Will Schedule with External Provider / Order Routed & Care Team Updated if Applicable              Cervical Cancer Screening []  Pap Smear Scheduled in Primary Care or OBGYN    []  External Records Requested & Care Team Updated if Applicable       []  External Records Uploaded, Care Team Updated, & History Updated if Applicable    []  Patient Declined Scheduling Pap Smear    []  Patient Will Schedule with External Provider & Care Team Updated if Applicable                  Colorectal Cancer Screening []  Colonoscopy Case Request / Referral / Home Test Order Placed    []  External Records Requested & Care Team Updated if Applicable    []  External Records Uploaded, Care Team Updated, & History Updated if Applicable    []  Patient Declined Completing Colon Cancer Screening    []  Patient Will Schedule with External Provider & Care Team Updated if Applicable    []  Fit Kit Mailed (add the SmartPhrase under additional notes)    []  Reminded Patient to Complete Home Test                Diabetic Eye Exam []  Eye Exam Screening Order Placed    []  Eye Camera Scheduled or Optometry/Ophthalmology Referral  Placed    []  External Records Requested & Care Team Updated if Applicable    []  External Records Uploaded, Care Team Updated, & History Updated if Applicable    []  Patient Declined Scheduling Eye Exam    []  Patient Will Schedule with External Provider & Care Team Updated if Applicable             Blood Pressure Control []  Primary Care Follow Up Visit Scheduled     []  Remote Blood Pressure Reading Captured    []  Patient Declined Remote Reading or Scheduling Appt - Escalated to PCP    []  Patient Will Call Back or Send Portal Message with Reading                 HbA1c & Other Labs []  Overdue Lab(s) Ordered    []  Overdue Lab(s) Scheduled    []  External Records Uploaded & Care Team Updated if Applicable    []  Primary Care Follow Up Visit Scheduled     []  Reminded Patient to Complete A1c Home Test    []  Patient Declined Scheduling Labs or Will Call Back to Schedule    []  Patient Will Schedule with External Provider / Order Routed, & Care Team Updated if Applicable           Primary Care Appointment []  Primary Care Appt Scheduled    []  Patient Declined Scheduling or Will Call Back to Schedule    []  Pt Established with External Provider, Updated Care Team, & Informed Pt to Notify Payor if Applicable           Medication Adherence /    Statin Use []  Primary Care Appointment Scheduled    []  Patient Reminded to  Prescription    []  Patient Declined, Provider Notified if Needed    []  Sent Provider Message to Review to Evaluate Pt for Statin, Add Exclusion Dx Codes, Document   Exclusion in Problem List, Change Statin Intensity Level to Moderate or High Intensity if Applicable                Osteoporosis Screening []  Dexa Order Placed    []  Dexa Appointment Scheduled    []  External Records Requested & Care Team Updated    []  External Records Uploaded, Care Team Updated, & History Updated if Applicable    []  Patient Declined Scheduling Dexa or Will Call Back to Schedule    []  Patient Will Schedule  with External Provider / Order Routed & Care Team Updated if Applicable       Additional Notes:.  Post visit Population Health review of encounter with date of service  2/8/24 with Arnie. Not  All required HY components in encounter.  Chart is open and needs level of service and z00.00 or z00.01 added and used as primary dx.  Labs are in as future message sent to provider's staff via staff message. Does have wellness plan.Aranza  Followup appt for: 2/10/25 HY

## 2024-02-12 LAB
ALBUMIN SERPL BCP-MCNC: 3.7 G/DL (ref 3.5–5)
ALBUMIN/GLOB SERPL: 0.8 {RATIO}
ALP SERPL-CCNC: 71 U/L (ref 39–100)
ALT SERPL W P-5'-P-CCNC: 21 U/L (ref 13–56)
ANION GAP SERPL CALCULATED.3IONS-SCNC: 9 MMOL/L (ref 7–16)
AST SERPL W P-5'-P-CCNC: 20 U/L (ref 15–37)
BILIRUB SERPL-MCNC: 0.6 MG/DL (ref ?–1.2)
BUN SERPL-MCNC: 25 MG/DL (ref 7–18)
BUN/CREAT SERPL: 8 (ref 6–20)
CALCIUM SERPL-MCNC: 7.7 MG/DL (ref 8.5–10.1)
CHLORIDE SERPL-SCNC: 103 MMOL/L (ref 98–107)
CHOLEST SERPL-MCNC: 194 MG/DL (ref 0–200)
CHOLEST/HDLC SERPL: 3.1 {RATIO}
CO2 SERPL-SCNC: 29 MMOL/L (ref 21–32)
CREAT SERPL-MCNC: 3.32 MG/DL (ref 0.55–1.02)
EGFR (NO RACE VARIABLE) (RUSH/TITUS): 16 ML/MIN/1.73M2
GLOBULIN SER-MCNC: 4.7 G/DL (ref 2–4)
GLUCOSE SERPL-MCNC: 103 MG/DL (ref 74–106)
GLUCOSE SERPL-MCNC: 103 MG/DL (ref 74–106)
HDLC SERPL-MCNC: 63 MG/DL (ref 40–60)
LDLC SERPL CALC-MCNC: 100 MG/DL
LDLC/HDLC SERPL: 1.6 {RATIO}
NONHDLC SERPL-MCNC: 131 MG/DL
POTASSIUM SERPL-SCNC: 2.8 MMOL/L (ref 3.5–5.1)
PROT SERPL-MCNC: 8.4 G/DL (ref 6.4–8.2)
SODIUM SERPL-SCNC: 138 MMOL/L (ref 136–145)
TRIGL SERPL-MCNC: 156 MG/DL (ref 35–150)
TSH SERPL DL<=0.005 MIU/L-ACNC: 2.21 UIU/ML (ref 0.36–3.74)
VLDLC SERPL-MCNC: 31 MG/DL

## 2024-02-12 PROCEDURE — 83036 HEMOGLOBIN GLYCOSYLATED A1C: CPT | Mod: ICN,,, | Performed by: CLINICAL MEDICAL LABORATORY

## 2024-02-12 PROCEDURE — 84443 ASSAY THYROID STIM HORMONE: CPT | Mod: ICN,,, | Performed by: CLINICAL MEDICAL LABORATORY

## 2024-02-12 PROCEDURE — 80061 LIPID PANEL: CPT | Mod: ICN,,, | Performed by: CLINICAL MEDICAL LABORATORY

## 2024-02-12 PROCEDURE — 80053 COMPREHEN METABOLIC PANEL: CPT | Mod: ICN,,, | Performed by: CLINICAL MEDICAL LABORATORY

## 2024-02-13 LAB
EST. AVERAGE GLUCOSE BLD GHB EST-MCNC: 117 MG/DL
HBA1C MFR BLD HPLC: 5.7 % (ref 4.5–6.6)

## 2024-02-13 NOTE — PATIENT INSTRUCTIONS
"Patient Education       High Blood Pressure in Adults   The Basics   Written by the doctors and editors at [facility]   What is high blood pressure? -- High blood pressure is a condition that puts you at risk for heart attack, stroke, and kidney disease. It does not usually cause symptoms. But it can be serious.  When your doctor or nurse tells you your blood pressure, they will say 2 numbers. For instance, your doctor or nurse might say that your blood pressure is "130 over 80." The top number is the pressure inside your arteries when your heart is shay. The bottom number is the pressure inside your arteries when your heart is relaxed.  "Elevated blood pressure" is a term doctors or nurses use as a warning. People with elevated blood pressure do not yet have high blood pressure. But their blood pressure is not as low as it should be for good health.  Many experts define high, elevated, and normal blood pressure as follows:  High - Top number of 130 or above and/or bottom number of 80 or above  Elevated - Top number between 120 and 129 and bottom number of 79 or below  Normal - Top number of 119 or below and bottom number of 79 or below  This information is also in the table (table 1).   How can I lower my blood pressure? -- If your doctor or nurse has prescribed blood pressure medicine, the most important thing you can do is to take it. If it causes side effects, do not just stop taking it. Instead, talk to your doctor or nurse about the problems it causes. They might be able to lower your dose or switch you to another medicine. If cost is a problem, mention that too. They might be able to put you on a less expensive medicine. Taking your blood pressure medicine can keep you from having a heart attack or stroke, and it can save your life!  Can I do anything on my own? -- You have a lot of control over your blood pressure. To lower it:  Lose weight (if you are overweight)  Choose a diet low in fat and rich in " "fruits, vegetables, and low-fat dairy products  Reduce the amount of salt you eat  Do something active for at least 30 minutes a day on most days of the week  Cut down on alcohol (if you drink more than 2 alcoholic drinks per day)  It's also a good idea to get a home blood pressure meter. People who check their own blood pressure at home do better at keeping it low and can sometimes even reduce the amount of medicine they take.  All topics are updated as new evidence becomes available and our peer review process is complete.  This topic retrieved from [employer] on: Sep 21, 2021.  Topic 78391 Version 15.0  Release: 29.4.2 - C29.263  © 2021 UpToDate, Inc. and/or its affiliates. All rights reserved.  table 1: Definition of normal and high blood pressure  Level  Top number  Bottom number    High 130 or above 80 or above   Elevated 120 to 129 79 or below   Normal 119 or below 79 or below   These definitions are from the American College of Cardiology/American Heart Association. Other expert groups might use slightly different definitions.  "Elevated blood pressure" is a term doctor or nurses use as a warning. It means you do not yet have high blood pressure, but your blood pressure is not as low as it should be for good health.  Graphic 43874 Version 6.0  Consumer Information Use and Disclaimer   This information is not specific medical advice and does not replace information you receive from your health care provider. This is only a brief summary of general information. It does NOT include all information about conditions, illnesses, injuries, tests, procedures, treatments, therapies, discharge instructions or life-style choices that may apply to you. You must talk with your health care provider for complete information about your health and treatment options. This information should not be used to decide whether or not to accept your health care provider's advice, instructions or recommendations. Only your health care " provider has the knowledge and training to provide advice that is right for you. The use of this information is governed by the [employer] End User License Agreement, available at https://www.[employer]lyce.[employer]/en/solutions/[employer]/about/jp.The use of [employer] content is governed by the [employer] Terms of Use. ©2021 UpToDate, Inc. All rights reserved.  Copyright   © 2021 UpToDate, Inc. and/or its affiliates. All rights reserved.    Patient Education       Diet and Health   The Basics   Written by the doctors and editors at [facility]   Why is it important to eat a healthy diet? -- It's important to eat a healthy diet because eating the right foods can keep you healthy now and later on in life.  Which foods are especially healthy? -- Foods that are especially healthy include:  Fruits and vegetables - Eating a diet with lots of fruits and vegetables can help prevent heart disease and strokes. It might also help prevent certain types of cancers. Try to eat fruits and vegetables at each meal and also for snacks. If you don't have fresh fruits and vegetables available, you can eat frozen or canned ones instead. Doctors recommend eating at least 2 1/2 servings of vegetables and 2 servings of fruits each day.  Foods with fiber - Eating foods with a lot of fiber can help prevent heart disease and strokes. If you have type 2 diabetes, it can also help control your blood sugar. Foods that have a lot of fiber include vegetables, fruits, beans, nuts, oatmeal, and whole grain breads and cereals. You can tell how much fiber is in a food by reading the nutrition label (figure 1). Doctors recommend eating 25 to 36 grams of fiber each day.  Foods with folate (also called folic acid) - Folate is a vitamin that is important for pregnant people, since it helps prevent certain birth defects. Anyone who could get pregnant should get at least 400 micrograms of folic acid daily, whether or not they are actively trying to get pregnant. Folate  "is found in many breakfast cereals, oranges, orange juice, and green leafy vegetables.  Foods with calcium and vitamin D - Babies, children, and adults need calcium and vitamin D to help keep their bones strong. Adults also need calcium and vitamin D to help prevent osteoporosis. Osteoporosis is a condition that causes bones to get "thin" and break more easily than usual. Different foods and drinks have calcium and vitamin D in them (figure 2). People who don't get enough calcium and vitamin D in their diet might need to take a supplement.  Foods with protein - Protein helps your muscles stay strong. Healthy foods with a lot of protein include chicken, fish, eggs, beans, nuts, and soy products. Red meat also has a lot of protein, but it also contains fats, which can be unhealthy.  Some experts recommend a "Mediterranean diet." This involves eating a lot of fruits, vegetables, nuts, whole grains, and olive oil. It also includes some fish, poultry, and dairy products, but not a lot of red meat. Eating this way can help your overall health, and might even lower your risk of having a stroke.  What foods should I avoid or limit? -- To eat a healthy diet, there are some things you should avoid or limit. They include:   Fats - There are different types of fats. Some types of fats are better for your body than others.  Trans fats are especially unhealthy. They are found in margarines, many fast foods, and some store-bought baked goods. Trans fats can raise your cholesterol level and your increase your chance of getting heart disease. You should avoid eating foods with these types of fats.  The type of "polyunsaturated" fats found in fish seems to be healthy and can reduce your chance of getting heart disease. Other polyunsaturated fats might also be good for your health. When you cook, it's best to use oils with healthier fats, such as olive oil and canola oil.  Sugar - To have a healthy diet, it's important to limit or " "avoid sugar, sweets, and refined grains. Refined grains are found in white bread, white rice, most forms of pasta, and most packaged "snack" foods. Whole grains, such as whole-wheat bread and brown rice, have more fiber and are better for your health.  Avoiding sugar-sweetened beverages, like soda and sports drinks, can also help improve your health.  Red meat - Studies have shown that eating a lot of red meat can increase your risk of certain health problems, including heart disease and cancer. You should limit the amount of red meat that you eat.  Can I drink alcohol as part of a healthy diet? -- People who drink a small amount of alcohol each day might have a lower chance of getting heart disease. But drinking alcohol can lead to problems. For example, it can raise a person's chances of getting liver disease and certain types of cancers. In women, even 1 drink a day can increase the risk of getting breast cancer.  Most doctors recommend that adult women not have more than 1 drink a day and that adult men not have more than 2 drinks a day. The limits are different because most women's bodies take longer to break down alcohol.  How many calories do I need each day? -- The number of calories you need each day depends on your weight, height, age, sex, and how active you are.  Your doctor or nurse can tell you how many calories you should eat each day. If you are trying to lose weight, you should eat fewer calories each day.  What if I have questions? -- If you have questions about which foods you should or should not eat, ask your doctor or nurse. The right diet for you will depend, in part, on your health and any medical conditions you have.  All topics are updated as new evidence becomes available and our peer review process is complete.  This topic retrieved from [employer] on: Sep 21, 2021.  Topic 68213 Version 20.0  Release: 29.4.2 - C29.263  © 2021 UpToDate, Inc. and/or its affiliates. All rights " "reserved.  figure 1: Nutrition label - fiber     This is an example of a nutrition label. To figure out how much fiber is in a food, look for the line that says "Dietary Fiber." It's also important to look at the serving size. This food has 7 grams of fiber in each serving, and each serving is 1 cup.  Graphic 75901 Version 7.0    figure 2: Foods and drinks with calcium and vitamin D     Foods rich in calcium include ice cream, soy milk, breads, kale, broccoli, milk, cheese, cottage cheese, almonds, yogurt, ready-to-eat cereals, beans, and tofu. Foods rich in vitamin D include milk, fortified plant-based "milks" (soy, almond), canned tuna fish, cod liver oil, yogurt, ready-to-eat-cereals, cooked salmon, canned sardines, mackerel, and eggs. Some of these foods are rich in both.  Graphic 95669 Version 4.0    Consumer Information Use and Disclaimer   This information is not specific medical advice and does not replace information you receive from your health care provider. This is only a brief summary of general information. It does NOT include all information about conditions, illnesses, injuries, tests, procedures, treatments, therapies, discharge instructions or life-style choices that may apply to you. You must talk with your health care provider for complete information about your health and treatment options. This information should not be used to decide whether or not to accept your health care provider's advice, instructions or recommendations. Only your health care provider has the knowledge and training to provide advice that is right for you. The use of this information is governed by the [employer] End User License Agreement, available at https://www.[employer]/en/solutions/[employer]/about/jp.The use of [employer] content is governed by the [employer] Terms of Use. ©2021 UpToDate, Inc. All rights reserved.  Copyright   © 2021 UpToDate, Inc. and/or its affiliates. All rights reserved.    "

## 2024-02-13 NOTE — PROGRESS NOTES
"  [facility]    Subjective:     Patient ID: Donovan Enriquez is a 85 y.o. male.    Chief Complaint: Wound care    CC: blister RLE and another LLE ulcer        84 y/o WM with hypertension, neuropathy, mixed PVD with recurrent ulcers, lymphedema, CAD, cardiomegaly, asthma on chronic  Plavix , prior GI bleeds, and anemia who I initially met back in  June 2023 when referred after a rehab stay to help treat a mulitude of LLE stasis ulcers.  His vascular treatment is per Dr Darrian Higgins who has done angiograms and venograms . When I treated him, he had a distal LLE anterior leg ulcer and a left calf ulcer.  Both Healed and he was discharged in early November 2023.  He returned to us  on 1/4/24 when he noted a new large bulla entity noted on right anterior shin for past week. He says he does wear compression stockings given per Dr Higgins office.  He also has a new ulcer on distal left leg on anterior surface as well just distal to where prior ulcer was located and a skin tear on right foot.  I unroofed the bulla and advised of dressings. Doing well; foot and LLE ulcer seems closed; He just saw Gisela and reports he said all is well. Will review the report when I get it        Review of Systems   Constitutional: Negative.    HENT: Negative.     Respiratory: Negative.     Cardiovascular: Negative.    Gastrointestinal: Negative.    Musculoskeletal: Negative.    Skin:  Positive for wound.   Neurological: Negative.        Objective:     Visit Vitals  BP (!) 101/58 (BP Location: Right arm)   Pulse 70   Temp 98.3 °F (36.8 °C) (Oral)   Resp 20   Ht 6' 2" (1.88 m)   Wt 90.3 kg (199 lb)   BMI 25.55 kg/m²       Physical Exam  Vitals reviewed.   Constitutional:       Comments: Elderly WM   Cardiovascular:      Pulses:           Dorsalis pedis pulses are detected w/ Doppler on the right side and detected w/ Doppler on the left side.      Comments: No ischemic changes to foot  Pulmonary:      Effort: Pulmonary " Subjective     Patient ID: Elif Romo is a 48 y.o. female.    Chief Complaint: Health Maintenance (Patient is here for a Healthy You visit. She states she has been doing well.) and Hypertension (Patient states that her Blood pressure has been good. She checks it at night at work.)    48 year old female presents to clinic for Healthy You visit. States she has been doing well and denies problems.       Review of Systems   Constitutional:  Negative for activity change, appetite change, fatigue and fever.   HENT:  Negative for nasal congestion, ear pain, rhinorrhea, sinus pressure/congestion and sore throat.    Eyes:  Negative for pain, redness, visual disturbance and eye dryness.   Respiratory:  Negative for cough and shortness of breath.    Cardiovascular:  Negative for chest pain and leg swelling.   Gastrointestinal:  Negative for abdominal distention, abdominal pain, constipation and diarrhea.   Endocrine: Negative for cold intolerance, heat intolerance and polyuria.   Genitourinary:  Negative for bladder incontinence, dysuria, frequency and urgency.   Musculoskeletal:  Negative for arthralgias, gait problem and myalgias.   Integumentary:  Negative for color change, rash and wound.   Allergic/Immunologic: Negative for environmental allergies and food allergies.   Neurological:  Negative for dizziness, weakness, light-headedness and headaches.   Psychiatric/Behavioral:  Negative for behavioral problems and sleep disturbance.        Tobacco Use: Medium Risk (2/8/2024)    Patient History     Smoking Tobacco Use: Never     Smokeless Tobacco Use: Never     Passive Exposure: Current     Review of patient's allergies indicates:   Allergen Reactions    Bactrim [sulfamethoxazole-trimethoprim] Hives    Ceclor [cefaclor] Hives     Current Outpatient Medications   Medication Instructions    amLODIPine (NORVASC) 10 mg, Oral, Daily    FLUoxetine 40 mg, Oral, Daily    hydroCHLOROthiazide (HYDRODIURIL) 25 mg, Oral, Daily     "hydrOXYzine pamoate (VISTARIL) 50 mg, Oral, Every 8 hours PRN    ipratropium (ATROVENT) 21 mcg (0.03 %) nasal spray 2 sprays, Each Nostril, 2 times daily    levothyroxine (EUTHYROX) 150 mcg, Oral, Daily    mv-min/iron/folic/calcium/vitK (WOMEN'S MULTIVITAMIN ORAL) 1 tablet, Oral, Daily    potassium chloride (KLOR-CON) 10 MEQ TbSR 10 mEq, Oral, Daily    rosuvastatin (CRESTOR) 10 mg, Oral, Daily     Medications Discontinued During This Encounter   Medication Reason    hydrOXYzine pamoate (VISTARIL) 50 MG Cap Reorder       Past Medical History:   Diagnosis Date    Anxiety     Depression     Hyperlipidemia     Hypertension     Hypothyroidism      Health Maintenance Topics with due status: Not Due       Topic Last Completion Date    Lipid Panel 02/12/2024     Immunization History   Administered Date(s) Administered    COVID-19, MRNA, LN-S, PF (MODERNA FULL 0.5 ML DOSE) 07/09/2021, 08/06/2021    Influenza 11/01/2021, 10/05/2022    Influenza - Quadrivalent - PF *Preferred* (6 months and older) 10/20/2022, 10/31/2023       Objective     Body mass index is 49.58 kg/m².  Wt Readings from Last 3 Encounters:   02/08/24 119 kg (262 lb 6.4 oz)   12/05/23 121.1 kg (267 lb)   04/14/23 124.2 kg (273 lb 12.8 oz)     Ht Readings from Last 3 Encounters:   02/08/24 5' 1" (1.549 m)   12/05/23 5' 1" (1.549 m)   04/14/23 5' 1" (1.549 m)     BP Readings from Last 3 Encounters:   02/08/24 134/84   12/05/23 136/76   04/14/23 138/84     Temp Readings from Last 3 Encounters:   02/08/24 98.4 °F (36.9 °C) (Oral)   12/05/23 98.4 °F (36.9 °C) (Oral)   04/14/23 98.3 °F (36.8 °C) (Oral)     Pulse Readings from Last 3 Encounters:   02/08/24 88   12/05/23 88   04/14/23 94     Resp Readings from Last 3 Encounters:   02/08/24 20   12/05/23 16   04/14/23 17     PF Readings from Last 3 Encounters:   No data found for PF       Physical Exam  Vitals and nursing note reviewed.   Constitutional:       Appearance: She is obese.   HENT:      Head: Normocephalic. " effort is normal.   Musculoskeletal:      Right lower le+ Edema present.      Left lower le+ Edema present.        Legs:    Skin:     General: Skin is warm and dry.      Capillary Refill: Capillary refill takes less than 2 seconds.   Neurological:      General: No focal deficit present.      Mental Status: He is alert and oriented to person, place, and time. Mental status is at baseline.            Altered Skin Integrity 24 1121 Right anterior;lower Leg #1 Blister(s) (Active)   24 1121   Altered Skin Integrity Present on Admission - Did Patient arrive to the hospital with altered skin?:    Side: Right   Orientation: anterior;lower   Location: Leg   Wound Number: #1   Is this injury device related?: No   Primary Wound Type: Blister(s)   Description of Altered Skin Integrity:    Ankle-Brachial Index: 1.09   Pulses: palpable x 4  biphasic x 3 / R dp mono   Removal Indication and Assessment:    Wound Outcome:    (Retired) Wound Length (cm):    (Retired) Wound Width (cm):    (Retired) Depth (cm):    Wound Description (Comments):    Removal Indications:    Wound Image    24   Description of Altered Skin Integrity Full thickness tissue loss. Subcutaneous fat may be visible but bone, tendon or muscle are not exposed 24 135   Dressing Appearance Intact;Moist drainage 24   Drainage Amount Moderate 24   Drainage Characteristics/Odor Serosanguineous;Yellow 24   Appearance Pink;Red;Yellow 24   Tissue loss description Full thickness 24 135   Black (%), Wound Tissue Color 0 % 24 135   Red (%), Wound Tissue Color 50 % 24 135   Yellow (%), Wound Tissue Color 50 % 24   Periwound Area Intact;Redness 24   Wound Edges Defined 24 135   Wound Length (cm) 5.3 cm 24 135   Wound Width (cm) 3.8 cm 24   Wound Depth (cm) 0.1 cm 24   Wound Volume (cm^3) 2.014 cm^3 24 135   Wound       Right Ear: Tympanic membrane normal.      Left Ear: Tympanic membrane normal.      Nose: Nose normal.      Mouth/Throat:      Mouth: Mucous membranes are moist.      Pharynx: Oropharynx is clear. No posterior oropharyngeal erythema.   Eyes:      Conjunctiva/sclera: Conjunctivae normal.   Cardiovascular:      Rate and Rhythm: Normal rate and regular rhythm.      Pulses: Normal pulses.      Heart sounds: Normal heart sounds.   Pulmonary:      Effort: Pulmonary effort is normal.      Breath sounds: Normal breath sounds.   Abdominal:      General: Abdomen is flat. Bowel sounds are normal. There is no distension.      Palpations: Abdomen is soft.   Musculoskeletal:         General: No swelling or tenderness. Normal range of motion.      Cervical back: Normal range of motion.      Right lower leg: No edema.      Left lower leg: No edema.   Skin:     General: Skin is warm and dry.      Capillary Refill: Capillary refill takes less than 2 seconds.   Neurological:      Mental Status: She is alert. Mental status is at baseline.   Psychiatric:         Mood and Affect: Mood normal.         Behavior: Behavior normal.         Assessment and Plan     Problem List Items Addressed This Visit          Endocrine    Hypothyroidism    Relevant Orders    Comprehensive Metabolic Panel (Completed)    TSH (Completed)     Other Visit Diagnoses       Routine general medical examination at a health care facility    -  Primary    Screening for lipid disorders        Relevant Orders    Lipid Panel (Completed)    Screening for diabetes mellitus        Relevant Orders    Hemoglobin A1C    Glucose, Fasting (Completed)    Encounter for hepatitis C screening test for low risk patient                Plan:    Instructed patient to keep appts as scheduled.   Instructed on DASH diet and increased exercise. Recommended at least 150 minutes a week with resistance training as tolerated. Monitor weight and try to lose some.   Instructed on importance of  Surface Area (cm^2) 20.14 cm^2 01/18/24 1350   Care Cleansed with:;Antimicrobial agent;Applied: 01/18/24 1350   Dressing Removed;Applied;Calcium alginate;Silver;Rolled gauze 01/18/24 1350       [REMOVED]      Altered Skin Integrity 01/05/24 1122 Right dorsal Foot #2 Traumatic (Removed)   01/05/24 1122   Altered Skin Integrity Present on Admission - Did Patient arrive to the hospital with altered skin?: yes   Side: Right   Orientation: dorsal   Location: Foot   Wound Number: #2   Is this injury device related?: No   Primary Wound Type: Traumatic   Description of Altered Skin Integrity:    Ankle-Brachial Index:    Pulses:    Removal Indication and Assessment:    Wound Outcome: Healed   (Retired) Wound Length (cm):    (Retired) Wound Width (cm):    (Retired) Depth (cm):    Wound Description (Comments):    Removal Indications:    Removed 01/18/24 1655   Wound Image   01/18/24 1406   Dressing Appearance Dry;Intact 01/18/24 1406   Appearance Pink;Yellow 01/18/24 1406   Periwound Area Intact;Redness 01/18/24 1406   Wound Edges Approximated 01/18/24 1406   Wound Length (cm) 0.8 cm 01/18/24 1406   Wound Width (cm) 1 cm 01/18/24 1406   Wound Depth (cm) 0 cm 01/18/24 1406   Wound Volume (cm^3) 0 cm^3 01/18/24 1406   Wound Surface Area (cm^2) 0.8 cm^2 01/18/24 1406   Care Cleansed with:;Antimicrobial agent;Applied: 01/18/24 1406       [REMOVED]      Altered Skin Integrity 01/05/24 1123 Left distal;lower Leg #3 Venous Ulcer (Removed)   01/05/24 1123   Altered Skin Integrity Present on Admission - Did Patient arrive to the hospital with altered skin?: yes   Side: Left   Orientation: distal;lower   Location: Leg   Wound Number: #3   Is this injury device related?: No   Primary Wound Type: Venous ulcer   Description of Altered Skin Integrity:    Ankle-Brachial Index:    Pulses:    Removal Indication and Assessment: not present upon hospital arrival   Wound Outcome: Healed   (Retired) Wound Length (cm):    (Retired) Wound Width  "(cm):    (Retired) Depth (cm):    Wound Description (Comments):    Removal Indications:    Removed 01/18/24 1656   Wound Image   01/18/24 1408   Dressing Appearance Dry;Intact 01/18/24 1408   Appearance Pink;Yellow 01/18/24 1408   Black (%), Wound Tissue Color 0 % 01/18/24 1408   Red (%), Wound Tissue Color 0 % 01/18/24 1408   Yellow (%), Wound Tissue Color 100 % 01/18/24 1408   Periwound Area Intact;Redness 01/18/24 1408   Wound Edges Approximated 01/18/24 1408   Wound Length (cm) 0.5 cm 01/18/24 1408   Wound Width (cm) 0.5 cm 01/18/24 1408   Wound Depth (cm) 0 cm 01/18/24 1408   Wound Volume (cm^3) 0 cm^3 01/18/24 1408   Wound Surface Area (cm^2) 0.25 cm^2 01/18/24 1408   Care Cleansed with:;Antimicrobial agent;Applied: 01/18/24 1408     Labs Reviewed:     Chemistry:  Lab Results   Component Value Date    BUN 20 06/09/2023    BUN 71 (H) 06/06/2023    CREATININE 0.68 06/09/2023    CREATININE 0.98 06/06/2023    GLUCOSE 95 09/04/2020    AST 30 09/04/2020    AST 35 (H) 09/03/2020    ALT 27 09/04/2020    ALT 27 09/03/2020    HGBA1C 5.2 09/02/2020    HGBA1C 5.3 09/02/2020        Hematology:  Lab Results   Component Value Date    WBC 7.9 09/04/2020    WBC 8.1 09/03/2020    HGB 9.4 (L) 06/09/2023    HGB 7.9 (L) 06/08/2023    HCT 27.8 (L) 06/09/2023    HCT 24.3 (L) 06/08/2023     09/04/2020     09/03/2020       Inflammatory Markers:  No results found for: "HSCRP", "SEDRATE"     hba1c  Lab Results   Component Value Date    HGBA1C 5.2 09/02/2020    HGBA1C 5.3 09/02/2020         Assessment and Plan:     1. Chronic ulcer of left leg with fat layer exposed  Overview:  Recurrent LLE ulcers: anterior and posterior: began late 2022 per patient:h/o +psudomonas treated in this clinic late June -November 2023 ; return to clinic on 1/5/24 for a new ulcer distal left leg near ankle (different than prior one):  Healing      2. Atherosclerosis of left leg  Overview:  April 2022 by Dr ANKIT Higgins; Angiogram: PTA of the left " taking all medications as prescribed.   Discussed yearly dental and eye exams.    Discussed use of sun screen, helmets and seat belts.  Gun safety discussed  Sleep discussed  Stay away from tobacco products    Discussed and provided with a screening schedule and personal prevention plan in accordance with USPSTF age appropriate recommendations and screening guidelines.   Education given and reviewed with patient. Counseling and referrals were provided as needed.  After Visit Summary printed and given to patient which includes written education and a list of any referrals if indicated.              I have reviewed the medications, allergies, and problem list.     Goal Actions:    What type of visit is the patient here for today?: Healthy You  Does the patient consent to enroll in [employer] Healthy?: Yes  Is this a Wellness Follow Up?: Yes  What is your overall wellness goal? (select at least one): Lose weight, Improve overall health, Lifestyle modifications  Choose 3: Lifestyle, Nutrition, Exercise  Lifestyle Actions : Obtain yearly mammogram, Take medications as prescribed, Pap smear or HPV  Nurtrition Actions: Recommend weight loss, Decrease intake of fast food, Avoid carbonated beverages, drink 8-10 glasses of water per day  Exercise Actions: Recommend physical activity 30 minutes per day 3-5 times/week       tibioperoneal trunk, posterior tibial artery and anterior tibial artery.       3. Ulcer of right lower extremity, limited to breakdown of skin  Overview:  New blister: return to clinic on 1/5/24:  Unroofed, superficial open wound without signs of infection      4. Idiopathic chronic venous hypertension of right leg with ulcer  Overview:  2022: Venogram :right-sided iliofemoral venous compression syndrome noted:  right external iliac vein, common iliac vein PTV and stenting      5. Atherosclerosis of right leg  Overview:  RLE angiogram with Dr Higgins Aug 2023: no records    6. Hypertension, unspecified type    7. Anemia, unspecified type  Overview:  With h/o GI bleed, June 2023      8. Coronary arteriosclerosis    9. Chronic anticoagulation  Overview:  Plavix        10. Chronic venous hypertension with ulcer and inflammation, left          Plan of Care:    Continue wound care as noted in the wound discharge orders per nurse  The other two wounds seem closed  Nutrition: Must have a high protein diet to support wound  healing; (if renal disease, see nephrologist for amount allowed):  this should be over 100g protein /day (if no kidney issues); Also rec MVI along with vit C, vit D, zinc and Uri  Compression: per Gisela  Return to clinic 1 week    The time spent including preparing to see the patient, obtaining patient history and assessment, evaluation of the plan of care, patient/caregiver counseling and education, orders, documentation, coordination of care, and other professional medical management activities for today's encounter was 20 minute.

## 2024-02-15 NOTE — PROGRESS NOTES
Lab reviewed; potassium was too low at 2.8.  Please check with her and make sure she is taking potassium 10 mEq daily.  If she has been taking this we probably need to increase to 20 mEq daily. She can also increase potassium in diet- Foods high in potassium include green leafy vegetables, potatoes, bananas, avocados, beans. Calcium was low again. Make sure she is taking calcium supplement as ordered (we don't have this listed so ask if she is taking OTC supplement or what).  Her kidney function was very poor. I believe she told me the other day she still had not got in to see her nephrologist- check with her on this. If we need to help facilitate an appt we can but she needs to get in to see them ASAP and avoid nephrotoxic drugs such as NSAIDs. TSH normal continue current dosage. Hgb A1C was 5.7 which is considered pre diabetic. No need to start meds at this time but she needs to decrease her carbs and sweets and increase exercise to prevent diabetes. Follow up in 3 months or as needed.

## 2024-02-19 ENCOUNTER — TELEPHONE (OUTPATIENT)
Dept: FAMILY MEDICINE | Facility: CLINIC | Age: 49
End: 2024-02-19
Payer: COMMERCIAL

## 2024-02-21 DIAGNOSIS — N18.4 STAGE 4 CHRONIC KIDNEY DISEASE: Primary | ICD-10-CM

## 2024-02-21 NOTE — TELEPHONE ENCOUNTER
Patient contacted clinic and spoke with SÁNCHEZ Muhammad. She reported that she has not seen any nephrologists in the past.

## 2024-02-23 DIAGNOSIS — N18.4 STAGE 4 CHRONIC KIDNEY DISEASE: Primary | ICD-10-CM

## 2024-02-23 NOTE — PROGRESS NOTES
Appt made with Dr Parada for March 18th at 3:30p. Please stress to patient the importance of keeping this appt. Renal ultrasound ordered to be done prior to appt. We will fax results to Dr Parada when received. Thanks.

## 2024-02-26 ENCOUNTER — HOSPITAL ENCOUNTER (OUTPATIENT)
Dept: RADIOLOGY | Facility: HOSPITAL | Age: 49
Discharge: HOME | End: 2024-02-26
Attending: NURSE PRACTITIONER
Payer: COMMERCIAL

## 2024-02-26 DIAGNOSIS — N18.4 STAGE 4 CHRONIC KIDNEY DISEASE: ICD-10-CM

## 2024-02-26 PROCEDURE — 76775 US EXAM ABDO BACK WALL LIM: CPT | Mod: TC

## 2024-02-26 NOTE — PROGRESS NOTES
Ultrasound reviewed and showed chronic kidney disease. Please forward these results to Dr Tal hale. She has an appt with them March 18th at 3:30pm. Please again stress importance of keeping this appt.

## 2024-04-01 PROBLEM — J01.40 ACUTE NON-RECURRENT PANSINUSITIS: Status: RESOLVED | Noted: 2023-04-14 | Resolved: 2024-04-01

## 2024-04-17 DIAGNOSIS — N18.4 KIDNEY DISEASE, CHRONIC, STAGE IV (GFR 15-29 ML/MIN): ICD-10-CM

## 2024-04-17 DIAGNOSIS — N18.4 STAGE 4 CHRONIC KIDNEY DISEASE: Primary | ICD-10-CM

## 2024-05-03 DIAGNOSIS — E87.6 HYPOKALEMIA: Primary | ICD-10-CM

## 2024-05-03 DIAGNOSIS — E83.51 HYPOCALCEMIA: ICD-10-CM

## 2024-05-03 PROBLEM — F41.9 ANXIETY: Status: ACTIVE | Noted: 2024-05-03

## 2024-05-03 PROBLEM — E83.39 HYPERPHOSPHATEMIA: Status: ACTIVE | Noted: 2024-05-03

## 2024-05-03 PROBLEM — I15.0 RENOVASCULAR HYPERTENSION: Status: ACTIVE | Noted: 2024-05-03

## 2024-05-03 PROBLEM — N26.1 LEFT RENAL ATROPHY: Status: ACTIVE | Noted: 2024-05-03

## 2024-05-03 RX ORDER — CALCIUM ACETATE 667 MG/1
667 CAPSULE ORAL
Qty: 90 CAPSULE | Refills: 11 | Status: SHIPPED | OUTPATIENT
Start: 2024-05-03 | End: 2025-05-03

## 2024-05-03 RX ORDER — FUROSEMIDE 40 MG/1
40 TABLET ORAL DAILY
COMMUNITY
Start: 2024-03-18

## 2024-05-03 RX ORDER — VIT C/E/ZN/COPPR/LUTEIN/ZEAXAN 250MG-90MG
1000 CAPSULE ORAL DAILY
COMMUNITY

## 2024-05-03 RX ORDER — POTASSIUM CHLORIDE 1500 MG/1
20 TABLET, EXTENDED RELEASE ORAL DAILY
Qty: 90 TABLET | Refills: 1 | Status: SHIPPED | OUTPATIENT
Start: 2024-05-03 | End: 2024-10-30

## 2024-07-12 ENCOUNTER — OFFICE VISIT (OUTPATIENT)
Dept: FAMILY MEDICINE | Facility: CLINIC | Age: 49
End: 2024-07-12
Payer: COMMERCIAL

## 2024-07-12 DIAGNOSIS — I10 PRIMARY HYPERTENSION: ICD-10-CM

## 2024-07-12 DIAGNOSIS — F41.9 ANXIETY: Primary | ICD-10-CM

## 2024-07-12 DIAGNOSIS — F32.A DEPRESSION, UNSPECIFIED DEPRESSION TYPE: ICD-10-CM

## 2024-07-12 DIAGNOSIS — E83.51 HYPOCALCEMIA: ICD-10-CM

## 2024-07-12 DIAGNOSIS — E78.2 MIXED HYPERLIPIDEMIA: ICD-10-CM

## 2024-07-12 DIAGNOSIS — E89.0 POSTOPERATIVE HYPOTHYROIDISM: ICD-10-CM

## 2024-07-12 LAB
ANION GAP SERPL CALCULATED.3IONS-SCNC: 14 MMOL/L (ref 7–16)
BUN SERPL-MCNC: 25 MG/DL (ref 7–18)
BUN/CREAT SERPL: 9 (ref 6–20)
CALCIUM SERPL-MCNC: 7.5 MG/DL (ref 8.5–10.1)
CHLORIDE SERPL-SCNC: 108 MMOL/L (ref 98–107)
CO2 SERPL-SCNC: 26 MMOL/L (ref 21–32)
CREAT SERPL-MCNC: 2.84 MG/DL (ref 0.55–1.02)
EGFR (NO RACE VARIABLE) (RUSH/TITUS): 20 ML/MIN/1.73M2
GLUCOSE SERPL-MCNC: 62 MG/DL (ref 74–106)
POTASSIUM SERPL-SCNC: 3.7 MMOL/L (ref 3.5–5.1)
SODIUM SERPL-SCNC: 144 MMOL/L (ref 136–145)
TSH SERPL DL<=0.005 MIU/L-ACNC: 0.05 UIU/ML (ref 0.36–3.74)

## 2024-07-12 PROCEDURE — 3044F HG A1C LEVEL LT 7.0%: CPT | Mod: ,,, | Performed by: NURSE PRACTITIONER

## 2024-07-12 PROCEDURE — 80048 BASIC METABOLIC PNL TOTAL CA: CPT | Mod: ,,, | Performed by: CLINICAL MEDICAL LABORATORY

## 2024-07-12 PROCEDURE — 3075F SYST BP GE 130 - 139MM HG: CPT | Mod: ,,, | Performed by: NURSE PRACTITIONER

## 2024-07-12 PROCEDURE — 3079F DIAST BP 80-89 MM HG: CPT | Mod: ,,, | Performed by: NURSE PRACTITIONER

## 2024-07-12 PROCEDURE — 1159F MED LIST DOCD IN RCRD: CPT | Mod: ,,, | Performed by: NURSE PRACTITIONER

## 2024-07-12 PROCEDURE — 3008F BODY MASS INDEX DOCD: CPT | Mod: ,,, | Performed by: NURSE PRACTITIONER

## 2024-07-12 PROCEDURE — 99213 OFFICE O/P EST LOW 20 MIN: CPT | Mod: ,,, | Performed by: NURSE PRACTITIONER

## 2024-07-12 PROCEDURE — 84443 ASSAY THYROID STIM HORMONE: CPT | Mod: ,,, | Performed by: CLINICAL MEDICAL LABORATORY

## 2024-07-12 RX ORDER — LEVOTHYROXINE SODIUM 150 UG/1
150 TABLET ORAL DAILY
Qty: 90 TABLET | Refills: 1 | Status: SHIPPED | OUTPATIENT
Start: 2024-07-12 | End: 2024-07-14

## 2024-07-12 RX ORDER — FLUOXETINE HYDROCHLORIDE 40 MG/1
40 CAPSULE ORAL DAILY
Qty: 90 CAPSULE | Refills: 1 | Status: SHIPPED | OUTPATIENT
Start: 2024-07-12 | End: 2025-01-08

## 2024-07-12 RX ORDER — AMLODIPINE BESYLATE 10 MG/1
10 TABLET ORAL DAILY
Qty: 90 TABLET | Refills: 1 | Status: SHIPPED | OUTPATIENT
Start: 2024-07-12

## 2024-07-12 RX ORDER — HYDROXYZINE PAMOATE 50 MG/1
50 CAPSULE ORAL EVERY 8 HOURS PRN
Qty: 90 CAPSULE | Refills: 1 | Status: SHIPPED | OUTPATIENT
Start: 2024-07-12

## 2024-07-12 RX ORDER — ROSUVASTATIN CALCIUM 10 MG/1
10 TABLET, COATED ORAL DAILY
Qty: 90 TABLET | Refills: 1 | Status: SHIPPED | OUTPATIENT
Start: 2024-07-12

## 2024-07-12 RX ORDER — FLUOXETINE HYDROCHLORIDE 40 MG/1
40 CAPSULE ORAL DAILY
Qty: 30 CAPSULE | Refills: 5 | Status: SHIPPED | OUTPATIENT
Start: 2024-07-12 | End: 2024-07-12

## 2024-07-12 NOTE — PROGRESS NOTES
Health Maintenance Discussed:    Topic Date Due    Hepatitis C Screening  Never done    Cervical Cancer Screening  Never done    Annual UACr  Sees Dr. Parada    TETANUS VACCINE  Had this done at [place]    Mammogram      Colorectal Cancer Screening  Never done    COVID-19 Vaccine (3 - 2023-24 season) 09/01/2023

## 2024-07-14 VITALS
SYSTOLIC BLOOD PRESSURE: 138 MMHG | OXYGEN SATURATION: 98 % | HEIGHT: 61 IN | TEMPERATURE: 99 F | HEART RATE: 88 BPM | DIASTOLIC BLOOD PRESSURE: 86 MMHG | WEIGHT: 248 LBS | BODY MASS INDEX: 46.82 KG/M2

## 2024-07-14 DIAGNOSIS — E89.0 POSTOPERATIVE HYPOTHYROIDISM: ICD-10-CM

## 2024-07-14 RX ORDER — LEVOTHYROXINE SODIUM 125 UG/1
125 TABLET ORAL DAILY
Qty: 90 TABLET | Refills: 1 | Status: SHIPPED | OUTPATIENT
Start: 2024-07-14

## 2024-07-14 RX ORDER — VIT C/E/ZN/COPPR/LUTEIN/ZEAXAN 250MG-90MG
1000 CAPSULE ORAL DAILY
Qty: 12 CAPSULE | Refills: 1 | Status: SHIPPED | OUTPATIENT
Start: 2024-07-14

## 2024-07-15 ENCOUNTER — TELEPHONE (OUTPATIENT)
Dept: FAMILY MEDICINE | Facility: CLINIC | Age: 49
End: 2024-07-15
Payer: COMMERCIAL

## 2024-07-15 ENCOUNTER — HOSPITAL ENCOUNTER (EMERGENCY)
Facility: HOSPITAL | Age: 49
Discharge: HOME | End: 2024-07-15
Payer: COMMERCIAL

## 2024-07-15 VITALS
TEMPERATURE: 98 F | BODY MASS INDEX: 45.5 KG/M2 | RESPIRATION RATE: 20 BRPM | OXYGEN SATURATION: 97 % | DIASTOLIC BLOOD PRESSURE: 87 MMHG | SYSTOLIC BLOOD PRESSURE: 131 MMHG | WEIGHT: 241 LBS | HEART RATE: 100 BPM | HEIGHT: 61 IN
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
## 2024-07-15 PROCEDURE — 99284 EMERGENCY DEPT VISIT MOD MDM: CPT | Mod: ,,, | Performed by: REGISTERED NURSE

## 2024-07-15 PROCEDURE — 99285 EMERGENCY DEPT VISIT HI MDM: CPT | Mod: 25

## 2024-07-15 PROCEDURE — 63600175 PHARM REV CODE 636 W HCPCS: Mod: JZ | Performed by: REGISTERED NURSE

## 2024-07-15 PROCEDURE — 96372 THER/PROPH/DIAG INJ SC/IM: CPT | Performed by: REGISTERED NURSE

## 2024-07-15 RX ORDER — MORPHINE SULFATE 4 MG/ML
4 INJECTION, SOLUTION INTRAMUSCULAR; INTRAVENOUS
Status: COMPLETED | OUTPATIENT
Start: 2024-07-15 | End: 2024-07-15

## 2024-07-15 RX ORDER — ONDANSETRON HYDROCHLORIDE 2 MG/ML
4 INJECTION, SOLUTION INTRAVENOUS
Status: COMPLETED | OUTPATIENT
Start: 2024-07-15 | End: 2024-07-15

## 2024-07-15 RX ADMIN — MORPHINE SULFATE 4 MG: 4 INJECTION, SOLUTION INTRAMUSCULAR; INTRAVENOUS at 04:07

## 2024-07-15 RX ADMIN — ONDANSETRON 4 MG: 2 INJECTION INTRAMUSCULAR; INTRAVENOUS at 04:07

## 2024-07-15 NOTE — Clinical Note
"Elif "Elif" Clarissa was seen and treated in our emergency department on 7/15/2024.  She may return to work on 07/17/2024.       If you have any questions or concerns, please don't hesitate to call.      Pacheco Ortez, NP-C"

## 2024-07-15 NOTE — TELEPHONE ENCOUNTER
Attempted to call pt about lab results, no answer, left message to return our call. Kidney fx improved, keep appt with Nephrology as scheduled. Calcium levels still low, encourage pt to take calcium supplement 667 mg TID & Vitamin D once weekly.  TSH low, decrease levothyroxine dosage to 125mcg daily.  F/U 6-8 weeks to recheck levels.    Attempted to call pt about lab results, no answer, left message.     Attempted to call pt about lab results, no answer, left message.

## 2024-07-15 NOTE — PROGRESS NOTES
Patti Gaitan NP   [place] [place] [id number]  [place]  [address]      PATIENT NAME: Elif Romo  : 1975  DATE: 24  MRN: [id number]      Billing Provider: Patti Gaitan NP  Level of Service: PA OFFICE/OUTPT VISIT, EST, LEVL III, 20-29 MIN  Patient PCP Information       Provider PCP Type    Patti Gaitan NP General            Reason for Visit / Chief Complaint: Hypertension (Routine visit for medication refill. )         History of Present Illness / Problem Focused Workflow     48 year old female presents to clinic for check up and medication refill.  Hypertension: Routine visit for medication refill.             Review of Systems     @Review of Systems   Constitutional:  Negative for activity change, appetite change, fatigue and fever.   HENT:  Negative for nasal congestion, ear pain, rhinorrhea, sinus pressure/congestion and sore throat.    Eyes:  Negative for pain, redness, visual disturbance and eye dryness.   Respiratory:  Negative for cough and shortness of breath.    Cardiovascular:  Negative for chest pain and leg swelling.   Gastrointestinal:  Negative for abdominal distention, abdominal pain, constipation and diarrhea.   Endocrine: Negative for cold intolerance, heat intolerance and polyuria.   Genitourinary:  Negative for bladder incontinence, dysuria, frequency and urgency.   Musculoskeletal:  Negative for arthralgias, gait problem and myalgias.   Integumentary:  Negative for color change, rash and wound.   Allergic/Immunologic: Negative for environmental allergies and food allergies.   Neurological:  Negative for dizziness, weakness, light-headedness and headaches.   Psychiatric/Behavioral:  Negative for behavioral problems and sleep disturbance.        Medical / Social / Family History     Past Medical History:   Diagnosis Date    Anxiety     Depression     Hyperlipidemia     Hypertension     Hypothyroidism        Past Surgical History:   Procedure  Laterality Date    TOTAL THYROIDECTOMY  01/2018           Medications and Allergies     Medications  Outpatient Medications Marked as Taking for the 7/12/24 encounter (Office Visit) with Patti Gaitan NP   Medication Sig Dispense Refill    calcium acetate,phosphat bind, (PHOSLO) 667 mg capsule Take 1 capsule (667 mg total) by mouth 3 (three) times daily with meals. 90 capsule 11    ipratropium (ATROVENT) 21 mcg (0.03 %) nasal spray 2 sprays by Each Nostril route 2 (two) times daily. 30 mL 5    LASIX 40 mg tablet Take 40 mg by mouth once daily.      potassium chloride (K-TAB) 20 mEq Take 1 tablet (20 mEq total) by mouth once daily. for 180 doses 90 tablet 1    [DISCONTINUED] amLODIPine (NORVASC) 10 MG tablet Take 1 tablet (10 mg total) by mouth once daily. 90 tablet 1    [DISCONTINUED] FLUoxetine 40 MG capsule Take 1 capsule (40 mg total) by mouth once daily. 30 capsule 5    [DISCONTINUED] hydrOXYzine pamoate (VISTARIL) 50 MG Cap Take 1 capsule (50 mg total) by mouth every 8 (eight) hours as needed (insomnia). 90 capsule 1    [DISCONTINUED] levothyroxine (EUTHYROX) 150 MCG tablet Take 1 tablet (150 mcg total) by mouth once daily. 90 tablet 1    [DISCONTINUED] rosuvastatin (CRESTOR) 10 MG tablet Take 1 tablet (10 mg total) by mouth once daily. 90 tablet 1       Allergies  Review of patient's allergies indicates:   Allergen Reactions    Bactrim [sulfamethoxazole-trimethoprim] Hives    Ceclor [cefaclor] Hives       Physical Examination     Vitals:    07/12/24 1550   BP: 138/86   Pulse:    Temp:      Physical Exam  Vitals and nursing note reviewed.   HENT:      Head: Normocephalic.      Right Ear: Tympanic membrane normal.      Left Ear: Tympanic membrane normal.      Nose: Nose normal.      Mouth/Throat:      Mouth: Mucous membranes are moist.      Pharynx: Oropharynx is clear. No posterior oropharyngeal erythema.   Eyes:      Conjunctiva/sclera: Conjunctivae normal.   Cardiovascular:      Rate and Rhythm:  Normal rate and regular rhythm.      Pulses: Normal pulses.      Heart sounds: Normal heart sounds.   Pulmonary:      Effort: Pulmonary effort is normal.      Breath sounds: Normal breath sounds.   Abdominal:      General: Abdomen is flat. Bowel sounds are normal. There is no distension.      Palpations: Abdomen is soft.   Musculoskeletal:         General: No swelling or tenderness. Normal range of motion.      Cervical back: Normal range of motion.      Right lower leg: No edema.      Left lower leg: No edema.   Skin:     General: Skin is warm and dry.      Capillary Refill: Capillary refill takes less than 2 seconds.   Neurological:      Mental Status: She is alert. Mental status is at baseline.   Psychiatric:         Mood and Affect: Mood normal.         Behavior: Behavior normal.               Lab Results   Component Value Date    WBC 13.59 (H) 12/05/2023    HGB 13.8 12/05/2023    HCT 42.1 12/05/2023    MCV 89.2 12/05/2023     12/05/2023        CMP  Sodium   Date Value Ref Range Status   07/12/2024 144 136 - 145 mmol/L Final     Potassium   Date Value Ref Range Status   07/12/2024 3.7 3.5 - 5.1 mmol/L Final     Chloride   Date Value Ref Range Status   07/12/2024 108 (H) 98 - 107 mmol/L Final     CO2   Date Value Ref Range Status   07/12/2024 26 21 - 32 mmol/L Final     Glucose   Date Value Ref Range Status   07/12/2024 62 (L) 74 - 106 mg/dL Final     BUN   Date Value Ref Range Status   07/12/2024 25 (H) 7 - 18 mg/dL Final     Creatinine   Date Value Ref Range Status   07/12/2024 2.84 (H) 0.55 - 1.02 mg/dL Final     Calcium   Date Value Ref Range Status   07/12/2024 7.5 (L) 8.5 - 10.1 mg/dL Final     Total Protein   Date Value Ref Range Status   02/12/2024 8.4 (H) 6.4 - 8.2 g/dL Final     Albumin   Date Value Ref Range Status   02/12/2024 3.7 3.5 - 5.0 g/dL Final     Bilirubin, Total   Date Value Ref Range Status   02/12/2024 0.6 >0.0 - 1.2 mg/dL Final     Alk Phos   Date Value Ref Range Status    02/12/2024 71 39 - 100 U/L Final     AST   Date Value Ref Range Status   02/12/2024 20 15 - 37 U/L Final     ALT   Date Value Ref Range Status   02/12/2024 21 13 - 56 U/L Final     Anion Gap   Date Value Ref Range Status   07/12/2024 14 7 - 16 mmol/L Final     eGFR   Date Value Ref Range Status   07/12/2024 20 (L) >=60 mL/min/1.73m2 Final     Procedures   Assessment and Plan (including Health Maintenance)   :    Plan:     Problem List Items Addressed This Visit          Psychiatric    Depression    Current Assessment & Plan     Well controlled on Fluoxetine. Continue at current dosage. Follow up in 3 months or as needed.          Relevant Medications    FLUoxetine 40 MG capsule    Anxiety - Primary    Current Assessment & Plan     Well controlled on Prozac. Continue at current dosage. Follow up in 3 months or as needed.             Cardiac/Vascular    Hypertension    Current Assessment & Plan     Blood pressure well controlled. Continue current medications. Follow up in 3 months or as needed.              Relevant Medications    amLODIPine (NORVASC) 10 MG tablet    Other Relevant Orders    Basic Metabolic Panel (Completed)    Hyperlipidemia    Current Assessment & Plan     Lipid panel obtained at today's visit. Goal LDL is less than 70. Continue current meds and low fat/low cholesterol diet with increased exercise as tolerated. Will follow up with labs. Patient to follow up in 3 months or as needed.            Relevant Medications    rosuvastatin (CRESTOR) 10 MG tablet       Renal/    Hypocalcemia    Current Assessment & Plan     CMP obtained today.             Endocrine    Hypothyroidism    Relevant Orders    TSH (Completed)       Health Maintenance Topics with due status: Not Due       Topic Last Completion Date    Influenza Vaccine 10/31/2023    Hemoglobin A1c (Diabetic Prevention Screening) 02/12/2024    Lipid Panel 02/12/2024       Future Appointments   Date Time Provider Department Center   2/10/2025 11:00  Patti Toledo NP [facility]        Health Maintenance Due   Topic Date Due    Hepatitis C Screening  Never done    Cervical Cancer Screening  Never done    Annual UACr  Never done    TETANUS VACCINE  Never done    Mammogram  Never done    Colorectal Cancer Screening  Never done    COVID-19 Vaccine (3 - 2023-24 season) 09/01/2023          Signature:  Patti Gaitan NP  [address] [place]  [address]    Date of encounter: 7/12/24

## 2024-07-15 NOTE — PROGRESS NOTES
Labs reviewed: Kidney function had actually improved some. Follow up with Nephrology as scheduled. Calcium still low. Encourage her to take calcium supplement as ordered. She should be taking 667mg three times daily and should be taking Vitamin D once weekly. TSH low- decrease levothyroxine dosage to 125mcg daily. Follow up 6-8 weeks for recheck of levels.
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
## 2024-07-16 ENCOUNTER — TELEPHONE (OUTPATIENT)
Dept: EMERGENCY MEDICINE | Facility: HOSPITAL | Age: 49
End: 2024-07-16
Payer: COMMERCIAL

## 2024-08-09 ENCOUNTER — OFFICE VISIT (OUTPATIENT)
Dept: FAMILY MEDICINE | Facility: CLINIC | Age: 49
End: 2024-08-09
Payer: COMMERCIAL

## 2024-08-09 VITALS
TEMPERATURE: 99 F | BODY MASS INDEX: 47.01 KG/M2 | SYSTOLIC BLOOD PRESSURE: 132 MMHG | HEART RATE: 87 BPM | OXYGEN SATURATION: 97 % | DIASTOLIC BLOOD PRESSURE: 78 MMHG | HEIGHT: 61 IN | WEIGHT: 249 LBS

## 2024-08-09 DIAGNOSIS — M54.2 NECK PAIN, MUSCULOSKELETAL: Primary | ICD-10-CM

## 2024-08-09 DIAGNOSIS — M62.838 MUSCLE SPASM: ICD-10-CM

## 2024-08-09 PROCEDURE — 1160F RVW MEDS BY RX/DR IN RCRD: CPT | Mod: ,,, | Performed by: NURSE PRACTITIONER

## 2024-08-09 PROCEDURE — 99213 OFFICE O/P EST LOW 20 MIN: CPT | Mod: ,,, | Performed by: NURSE PRACTITIONER

## 2024-08-09 PROCEDURE — 3078F DIAST BP <80 MM HG: CPT | Mod: ,,, | Performed by: NURSE PRACTITIONER

## 2024-08-09 PROCEDURE — 3008F BODY MASS INDEX DOCD: CPT | Mod: ,,, | Performed by: NURSE PRACTITIONER

## 2024-08-09 PROCEDURE — 3075F SYST BP GE 130 - 139MM HG: CPT | Mod: ,,, | Performed by: NURSE PRACTITIONER

## 2024-08-09 PROCEDURE — 3044F HG A1C LEVEL LT 7.0%: CPT | Mod: ,,, | Performed by: NURSE PRACTITIONER

## 2024-08-09 PROCEDURE — 1159F MED LIST DOCD IN RCRD: CPT | Mod: ,,, | Performed by: NURSE PRACTITIONER

## 2024-08-09 RX ORDER — METHOCARBAMOL 500 MG/1
500 TABLET, FILM COATED ORAL 3 TIMES DAILY
Qty: 30 TABLET | Refills: 0 | Status: SHIPPED | OUTPATIENT
Start: 2024-08-09 | End: 2024-08-19

## 2024-08-09 RX ORDER — LIDOCAINE 50 MG/G
1 PATCH TOPICAL DAILY
Qty: 30 PATCH | Refills: 1 | Status: SHIPPED | OUTPATIENT
Start: 2024-08-09
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
## 2024-08-23 PROBLEM — M54.2 NECK PAIN, MUSCULOSKELETAL: Status: ACTIVE | Noted: 2024-08-23

## 2024-08-23 PROBLEM — M62.838 MUSCLE SPASM: Status: ACTIVE | Noted: 2024-08-23

## 2024-08-23 NOTE — ASSESSMENT & PLAN NOTE
She has tenderness and spasms throughout trapezius, neck, and shoulders. Will treat with Robaxin as needed for muscle spasms and Lidocaine patch. Encouraged limited use of NSAIDs due to kidney function. Encouraged gentle stretching and use of heat or topical agenets such as biofreeze, IcyHot.

## 2024-10-14 ENCOUNTER — OFFICE VISIT (OUTPATIENT)
Dept: PAIN MEDICINE | Facility: CLINIC | Age: 49
End: 2024-10-14
Payer: COMMERCIAL

## 2024-10-14 VITALS
BODY MASS INDEX: 47.01 KG/M2 | RESPIRATION RATE: 18 BRPM | SYSTOLIC BLOOD PRESSURE: 139 MMHG | HEART RATE: 97 BPM | DIASTOLIC BLOOD PRESSURE: 88 MMHG | WEIGHT: 249 LBS | HEIGHT: 61 IN

## 2024-10-14 DIAGNOSIS — M54.2 CERVICALGIA: ICD-10-CM

## 2024-10-14 DIAGNOSIS — Z79.899 ENCOUNTER FOR LONG-TERM (CURRENT) USE OF MEDICATIONS: Primary | ICD-10-CM

## 2024-10-14 LAB

## 2024-10-14 PROCEDURE — 80305 DRUG TEST PRSMV DIR OPT OBS: CPT | Mod: PBBFAC | Performed by: PAIN MEDICINE

## 2024-10-14 PROCEDURE — 3008F BODY MASS INDEX DOCD: CPT | Mod: ,,, | Performed by: PAIN MEDICINE

## 2024-10-14 PROCEDURE — 3075F SYST BP GE 130 - 139MM HG: CPT | Mod: ,,, | Performed by: PAIN MEDICINE

## 2024-10-14 PROCEDURE — 99204 OFFICE O/P NEW MOD 45 MIN: CPT | Mod: S$PBB,,, | Performed by: PAIN MEDICINE

## 2024-10-14 PROCEDURE — 1159F MED LIST DOCD IN RCRD: CPT | Mod: ,,, | Performed by: PAIN MEDICINE

## 2024-10-14 PROCEDURE — 99999 PR PBB SHADOW E&M-EST. PATIENT-LVL V: CPT | Mod: PBBFAC,,, | Performed by: PAIN MEDICINE

## 2024-10-14 PROCEDURE — 99215 OFFICE O/P EST HI 40 MIN: CPT | Mod: PBBFAC | Performed by: PAIN MEDICINE

## 2024-10-14 PROCEDURE — 3079F DIAST BP 80-89 MM HG: CPT | Mod: ,,, | Performed by: PAIN MEDICINE

## 2024-10-14 PROCEDURE — 3044F HG A1C LEVEL LT 7.0%: CPT | Mod: ,,, | Performed by: PAIN MEDICINE

## 2024-10-14 PROCEDURE — 99999PBSHW POCT URINE DRUG SCREEN PRESUMP: Mod: PBBFAC,,,

## 2024-10-14 RX ORDER — ACETAMINOPHEN AND CODEINE PHOSPHATE 300; 30 MG/1; MG/1
1 TABLET ORAL 2 TIMES DAILY PRN
Qty: 15 TABLET | Refills: 0 | Status: SHIPPED | OUTPATIENT
Start: 2024-10-14 | End: 2024-11-13

## 2024-10-14 RX ORDER — METHOCARBAMOL 500 MG/1
500 TABLET, FILM COATED ORAL 4 TIMES DAILY
Qty: 40 TABLET | Refills: 0 | Status: SHIPPED | OUTPATIENT
Start: 2024-10-14 | End: 2024-10-24
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
## 2024-10-25 ENCOUNTER — CLINICAL SUPPORT (OUTPATIENT)
Dept: REHABILITATION | Facility: HOSPITAL | Age: 49
End: 2024-10-25
Payer: COMMERCIAL

## 2024-10-25 DIAGNOSIS — M54.2 CERVICALGIA: ICD-10-CM

## 2024-10-25 PROCEDURE — 97162 PT EVAL MOD COMPLEX 30 MIN: CPT

## 2024-10-28 ENCOUNTER — CLINICAL SUPPORT (OUTPATIENT)
Dept: REHABILITATION | Facility: HOSPITAL | Age: 49
End: 2024-10-28
Payer: COMMERCIAL

## 2024-10-28 DIAGNOSIS — M54.2 NECK PAIN, MUSCULOSKELETAL: Primary | ICD-10-CM

## 2024-10-28 PROCEDURE — 97110 THERAPEUTIC EXERCISES: CPT | Mod: CQ

## 2024-10-28 PROCEDURE — 97140 MANUAL THERAPY 1/> REGIONS: CPT | Mod: CQ

## 2024-11-01 ENCOUNTER — CLINICAL SUPPORT (OUTPATIENT)
Dept: REHABILITATION | Facility: HOSPITAL | Age: 49
End: 2024-11-01
Payer: COMMERCIAL

## 2024-11-01 DIAGNOSIS — M54.2 NECK PAIN, MUSCULOSKELETAL: Primary | ICD-10-CM

## 2024-11-01 PROCEDURE — 97140 MANUAL THERAPY 1/> REGIONS: CPT | Mod: CQ

## 2024-11-01 PROCEDURE — 97530 THERAPEUTIC ACTIVITIES: CPT | Mod: CQ

## 2024-11-01 PROCEDURE — 97110 THERAPEUTIC EXERCISES: CPT | Mod: CQ

## 2024-11-04 ENCOUNTER — CLINICAL SUPPORT (OUTPATIENT)
Dept: REHABILITATION | Facility: HOSPITAL | Age: 49
End: 2024-11-04
Payer: COMMERCIAL

## 2024-11-04 DIAGNOSIS — M54.2 NECK PAIN, MUSCULOSKELETAL: Primary | ICD-10-CM

## 2024-11-04 DIAGNOSIS — M54.2 CERVICALGIA: ICD-10-CM

## 2024-11-04 PROCEDURE — 97140 MANUAL THERAPY 1/> REGIONS: CPT | Mod: CQ

## 2024-11-04 PROCEDURE — 97110 THERAPEUTIC EXERCISES: CPT | Mod: CQ

## 2024-11-04 NOTE — PROGRESS NOTES
Name: Elif Romo  Clinic Number: [id number]    Therapy Diagnosis: No diagnosis found.  Physician: Amina Brumfield MD    Visit Date: 11/4/2024      Physician Orders: PT Eval and Treat   Medical Diagnosis from Referral: M54.2 (ICD-10-CM) - Cervicalgia  Evaluation Date: 10/25/2024  Plan of Care Expiration: 11/22/2024  Date of Surgery: N/A  Visit # / Visits authorized: 4/ 8   FOTO: 53/ 100  NDI: 32  PTA Visit #: 3/5    Time In: 1238  Time Out: 1323  Total Billable Time: 45 minutes    Precautions: Standard  Functional Level Prior to Evaluation: limited by pain    Subjective     Pt reports: she did exercises Saturday and has had more pain since then.   She  n/a  compliant with home exercise program.  Response to previous treatment: pain free after tx.  Functional change: none    Pain: 5/10 on arrival to 3/10 (minimal tingling) at end of treatment.   Location: right neck      Objective     Elif received therapeutic exercises to develop ROM, flexibility, and posture for 20 minutes including:     MHP applied during gentle ROM x 15 mins  Cervical Flexion, extension, rotation, lateral flexion x 10 reps each with instructions not to press toward any pain, to allow stretching within pain free ROM.     Elif received the following manual therapy techniques: Manual traction, Myofacial release, Soft tissue Mobilization, and Friction Massage were applied to the: bilateral anterior and lateral neck, upper to mid trap area for 25 minutes, including:  Cross friction to scalenes,, Levator scap release, sub- occipital release, anterior, lateral and posterior neck musculature STM and manual traction to c-spine. Upper trap stretches, neck rotation stretches.      Home Exercises Provided and Patient Education Provided     Education provided: pain free ROM Low load long duration stretches as long as they are pain free.     Written Home Exercises Provided:  11/1/24 .  Exercises were reviewed and Elif was able to demonstrate them  prior to the end of the session.  Elif demonstrated  good  understanding of the education provided.     See EMR under  Pt. instructions  for exercises provided  11/1/24 .    Assessment     Elif is a 48 y.o. female referred to outpatient Physical Therapy with a medical diagnosis of cervicalgia. Patient presents with reduced pain free range of motion, radicular pain to hands in both UE, difficulty with functional activities as described in FOTO assessment.     Elif Is progressing well towards her goals.   Pt prognosis is Excellent.     Pt will continue to benefit from skilled outpatient physical therapy to address the deficits listed in the problem list box on initial evaluation, provide pt/family education and to maximize pt's level of independence in the home and community environment.     Pt's spiritual, cultural and educational needs considered and pt agreeable to plan of care and goals.     Anticipated barriers to physical therapy: none    Goals:  4 weeks   1. Patient will be Independent with Home Exercise Program   2. Patient will demonstrate with improved Posture and Body Mechanics  3. Patient will increase Cervical Range of Motion by 10%   4. Patient will decrease complaints of pain with activity to 5/10    Plan     Plan of care Certification: 10/25/2024 to 11/22/2024.     Outpatient Physical Therapy 2 times weekly for 4 weeks to include the following interventions: Therapeutic Exercise, Therapeutic Activity, Manual Therapy, and Mechanical Traction    Ania Jack, PTA  11/4/2024

## 2024-11-08 ENCOUNTER — CLINICAL SUPPORT (OUTPATIENT)
Dept: REHABILITATION | Facility: HOSPITAL | Age: 49
End: 2024-11-08
Payer: COMMERCIAL

## 2024-11-08 DIAGNOSIS — M54.2 NECK PAIN, MUSCULOSKELETAL: Primary | ICD-10-CM

## 2024-11-08 PROCEDURE — 97110 THERAPEUTIC EXERCISES: CPT

## 2024-11-08 PROCEDURE — 97140 MANUAL THERAPY 1/> REGIONS: CPT

## 2024-11-08 NOTE — PROGRESS NOTES
Name: Elif Romo  Clinic Number: [id number]    Therapy Diagnosis:   Encounter Diagnosis   Name Primary?    Neck pain, musculoskeletal [M54.2] Yes     Physician: Amina Brumfield MD    Visit Date: 11/8/2024      Physician Orders: PT Eval and Treat   Medical Diagnosis from Referral: M54.2 (ICD-10-CM) - Cervicalgia  Evaluation Date: 10/25/2024  Plan of Care Expiration: 11/22/2024  Date of Surgery: N/A  Visit # / Visits authorized: 5/ 8   FOTO: 53/ 100  NDI: 32  PTA Visit #: 0/5    Time In: 12:30 PM  Time Out: 1:15 PM  Total Billable Time: 45 minutes    Precautions: Standard  Functional Level Prior to Evaluation: limited by pain    Subjective     Pt reports: absence of pain upon arrival   She  n/a  compliant with home exercise program.  Response to previous treatment: pain free after tx.  Functional change: none    Pain: 0/10 but still reports some tingling.   Location: right neck      Objective     Elif received therapeutic exercises to develop ROM, flexibility, and posture for 20 minutes including:     MHP applied during gentle ROM x 15 mins  Cervical Flexion, extension, rotation, lateral flexion x 10 reps each with instructions not to press toward any pain, to allow stretching within pain free ROM.     Elif received the following manual therapy techniques: Manual traction, Myofacial release, Soft tissue Mobilization, and Friction Massage were applied to the: bilateral anterior and lateral neck, upper to mid trap area for 25 minutes, including:  Cross friction to scalenes (NT),, Levator scap release (NT), sub- occipital release, anterior, lateral and posterior neck musculature STM and manual traction to c-spine. Upper trap stretches, neck rotation stretches.      Home Exercises Provided and Patient Education Provided     Education provided: pain free ROM Low load long duration stretches as long as they are pain free.     Written Home Exercises Provided:  11/1/24 .  Exercises were reviewed and Elif was  able to demonstrate them prior to the end of the session.  Elif demonstrated  good  understanding of the education provided.     See EMR under  Pt. instructions  for exercises provided  11/1/24 .    Assessment     Elif is a 48 y.o. female referred to outpatient Physical Therapy with a medical diagnosis of cervicalgia. Patient presents with reduced pain free range of motion, radicular pain to hands in both UE, difficulty with functional activities as described in FOTO assessment.     Elif Is progressing well towards her goals.   Pt prognosis is Excellent.     Pt will continue to benefit from skilled outpatient physical therapy to address the deficits listed in the problem list box on initial evaluation, provide pt/family education and to maximize pt's level of independence in the home and community environment.     Pt's spiritual, cultural and educational needs considered and pt agreeable to plan of care and goals.     Anticipated barriers to physical therapy: none    Goals:  4 weeks   1. Patient will be Independent with Home Exercise Program   2. Patient will demonstrate with improved Posture and Body Mechanics  3. Patient will increase Cervical Range of Motion by 10%   4. Patient will decrease complaints of pain with activity to 5/10    Plan     Plan of care Certification: 10/25/2024 to 11/22/2024.     Outpatient Physical Therapy 2 times weekly for 4 weeks to include the following interventions: Therapeutic Exercise, Therapeutic Activity, Manual Therapy, and Mechanical Traction    Ilir Talbot, PT  11/8/2024

## 2024-11-08 NOTE — PROGRESS NOTES
Name: Elif Romo  Clinic Number: [id number]    Therapy Diagnosis: No diagnosis found.  Physician: Amina Brumfield MD    Visit Date: 11/8/2024      Physician Orders: PT Eval and Treat   Medical Diagnosis from Referral: M54.2 (ICD-10-CM) - Cervicalgia  Evaluation Date: 10/25/2024  Plan of Care Expiration: 11/22/2024  Date of Surgery: N/A  Visit # / Visits authorized: 4/ 8   FOTO: 53/ 100  NDI: 32  PTA Visit #: 3/5    Time In: 1238  Time Out: 1323  Total Billable Time: 45 minutes    Precautions: Standard  Functional Level Prior to Evaluation: limited by pain    Subjective     Pt reports: she did exercises Saturday and has had more pain since then.   She  n/a  compliant with home exercise program.  Response to previous treatment: pain free after tx.  Functional change: none    Pain: 5/10 on arrival to 3/10 (minimal tingling) at end of treatment.   Location: right neck      Objective     Elif received therapeutic exercises to develop ROM, flexibility, and posture for 20 minutes including:     MHP applied during gentle ROM x 15 mins  Cervical Flexion, extension, rotation, lateral flexion x 10 reps each with instructions not to press toward any pain, to allow stretching within pain free ROM.     lEif received the following manual therapy techniques: Manual traction, Myofacial release, Soft tissue Mobilization, and Friction Massage were applied to the: bilateral anterior and lateral neck, upper to mid trap area for 25 minutes, including:  Cross friction to scalenes,, Levator scap release, sub- occipital release, anterior, lateral and posterior neck musculature STM and manual traction to c-spine. Upper trap stretches, neck rotation stretches.      Home Exercises Provided and Patient Education Provided     Education provided: pain free ROM Low load long duration stretches as long as they are pain free.     Written Home Exercises Provided:  11/1/24 .  Exercises were reviewed and Elif was able to demonstrate them  prior to the end of the session.  Elif demonstrated  good  understanding of the education provided.     See EMR under  Pt. instructions  for exercises provided  11/1/24 .    Assessment     Elif is a 48 y.o. female referred to outpatient Physical Therapy with a medical diagnosis of cervicalgia. Patient presents with reduced pain free range of motion, radicular pain to hands in both UE, difficulty with functional activities as described in FOTO assessment.     Elif Is progressing well towards her goals.   Pt prognosis is Excellent.     Pt will continue to benefit from skilled outpatient physical therapy to address the deficits listed in the problem list box on initial evaluation, provide pt/family education and to maximize pt's level of independence in the home and community environment.     Pt's spiritual, cultural and educational needs considered and pt agreeable to plan of care and goals.     Anticipated barriers to physical therapy: none    Goals:  4 weeks   1. Patient will be Independent with Home Exercise Program   2. Patient will demonstrate with improved Posture and Body Mechanics  3. Patient will increase Cervical Range of Motion by 10%   4. Patient will decrease complaints of pain with activity to 5/10    Plan     Plan of care Certification: 10/25/2024 to 11/22/2024.     Outpatient Physical Therapy 2 times weekly for 4 weeks to include the following interventions: Therapeutic Exercise, Therapeutic Activity, Manual Therapy, and Mechanical Traction    Ilir Talbot, PT  11/8/2024

## 2024-11-11 ENCOUNTER — CLINICAL SUPPORT (OUTPATIENT)
Dept: REHABILITATION | Facility: HOSPITAL | Age: 49
End: 2024-11-11
Payer: COMMERCIAL

## 2024-11-11 DIAGNOSIS — M54.2 CERVICALGIA: Primary | ICD-10-CM

## 2024-11-11 PROCEDURE — 97110 THERAPEUTIC EXERCISES: CPT

## 2024-11-11 PROCEDURE — 97012 MECHANICAL TRACTION THERAPY: CPT

## 2024-11-11 PROCEDURE — 97140 MANUAL THERAPY 1/> REGIONS: CPT

## 2024-11-11 NOTE — PROGRESS NOTES
Name: Elif Romo  Clinic Number: [id number]    Therapy Diagnosis:   No diagnosis found.    Physician: Amina Brumfield MD    Visit Date: 11/11/2024      Physician Orders: PT Eval and Treat   Medical Diagnosis from Referral: M54.2 (ICD-10-CM) - Cervicalgia  Evaluation Date: 10/25/2024  Plan of Care Expiration: 11/22/2024  Date of Surgery: N/A  Visit # / Visits authorized: 6/ 8   FOTO: 53/ 100  NDI: 32  PTA Visit #: 0/5    Time In: 12:30 PM  Time Out: 1:15 PM  Total Billable Time: 45 minutes    Precautions: Standard  Functional Level Prior to Evaluation: limited by pain    Subjective     Pt reports: absence of pain upon arrival   She  n/a  compliant with home exercise program.  Response to previous treatment: pain free after tx.  Functional change: none    Pain: 0/10 but still reports some tingling.   Location: right neck      Objective     Elif received therapeutic exercises to develop ROM, flexibility, and posture for 20 minutes including:     MHP applied during gentle ROM x 15 mins  Cervical Flexion, extension, rotation, lateral flexion x 10 reps each with instructions not to press toward any pain, to allow stretching within pain free ROM.     Elif received the following manual therapy techniques: Manual traction, Myofacial release, Soft tissue Mobilization, and Friction Massage were applied to the: bilateral anterior and lateral neck, upper to mid trap area for 25 minutes, including:  Cross friction to scalenes (NT),, Levator scap release (NT), sub- occipital release, anterior, lateral and posterior neck musculature STM and manual traction to c-spine. Upper trap stretches, neck rotation stretches.      Home Exercises Provided and Patient Education Provided     Education provided: pain free ROM Low load long duration stretches as long as they are pain free.     Written Home Exercises Provided:  11/1/24 .  Exercises were reviewed and Elif was able to demonstrate them prior to the end of the session.   Elif demonstrated  good  understanding of the education provided.     See EMR under  Pt. instructions  for exercises provided  11/1/24 .    Assessment     Elif is a 48 y.o. female referred to outpatient Physical Therapy with a medical diagnosis of cervicalgia. Patient presents with reduced pain free range of motion, radicular pain to hands in both UE, difficulty with functional activities as described in FOTO assessment.     Elif Is progressing well towards her goals.   Pt prognosis is Excellent.     Pt will continue to benefit from skilled outpatient physical therapy to address the deficits listed in the problem list box on initial evaluation, provide pt/family education and to maximize pt's level of independence in the home and community environment.     Pt's spiritual, cultural and educational needs considered and pt agreeable to plan of care and goals.     Anticipated barriers to physical therapy: none    Goals:  4 weeks   1. Patient will be Independent with Home Exercise Program   2. Patient will demonstrate with improved Posture and Body Mechanics  3. Patient will increase Cervical Range of Motion by 10%   4. Patient will decrease complaints of pain with activity to 5/10    Plan     Plan of care Certification: 10/25/2024 to 11/22/2024.     Outpatient Physical Therapy 2 times weekly for 4 weeks to include the following interventions: Therapeutic Exercise, Therapeutic Activity, Manual Therapy, and Mechanical Traction    Ilir Talbot, PT  11/11/2024

## 2024-11-11 NOTE — PROGRESS NOTES
Name: Elif Romo  Clinic Number: [id number]    Therapy Diagnosis:   Encounter Diagnosis   Name Primary?    Cervicalgia Yes     Physician: Amina Brumfield MD    Visit Date: 11/11/2024      Physician Orders: PT Eval and Treat   Medical Diagnosis from Referral: M54.2 (ICD-10-CM) - Cervicalgia  Evaluation Date: 10/25/2024  Plan of Care Expiration: 11/22/2024  Date of Surgery: N/A  Visit # / Visits authorized: 6/ 8   FOTO: 53/ 100  NDI: 32  PTA Visit #: 0/5    Time In: 12:30 PM  Time Out: 1:15 PM  Total Billable Time: 45 minutes    Precautions: Standard  Functional Level Prior to Evaluation: limited by pain    Subjective     Pt reports: absence of pain upon arrival   She  n/a  compliant with home exercise program.  Response to previous treatment: pain free after tx.  Functional change: none    Pain: 0/10 but still reports some tingling.   Location: right neck      Objective     Elif received therapeutic exercises to develop ROM, flexibility, and posture for 20 minutes including:     MHP applied during gentle ROM x 15 mins  Cervical Flexion, extension, rotation, lateral flexion x 10 reps each with instructions not to press toward any pain, to allow stretching within pain free ROM.     supervised modalities after being cleared for contradictions: Mechanical Traction:  Elif received static mechanical traction to the cervical spine at a force of 24 pounds for a total of 15 minutes. Patient tolerated treatment well without any adverse effects.    Elif received the following manual therapy techniques: Manual traction, Myofacial release, Soft tissue Mobilization, and Friction Massage were applied to the: bilateral anterior and lateral neck, upper to mid trap area for 10 minutes, including:  Cross friction to scalenes (NT),, Levator scap release (NT), sub- occipital release, anterior, lateral and posterior neck musculature STM and manual traction to c-spine. Upper trap stretches, neck rotation stretches.      Home  Exercises Provided and Patient Education Provided     Education provided: pain free ROM Low load long duration stretches as long as they are pain free.     Written Home Exercises Provided:  11/1/24 .  Exercises were reviewed and Elif was able to demonstrate them prior to the end of the session.  Elif demonstrated  good  understanding of the education provided.     See EMR under  Pt. instructions  for exercises provided  11/1/24 .    Assessment     Elif is a 48 y.o. female referred to outpatient Physical Therapy with a medical diagnosis of cervicalgia. Patient presents with reduced pain free range of motion, radicular pain to hands in both UE, difficulty with functional activities as described in FOTO assessment.     Elif Is progressing well towards her goals.   Pt prognosis is Excellent.     Pt will continue to benefit from skilled outpatient physical therapy to address the deficits listed in the problem list box on initial evaluation, provide pt/family education and to maximize pt's level of independence in the home and community environment.     Pt's spiritual, cultural and educational needs considered and pt agreeable to plan of care and goals.     Anticipated barriers to physical therapy: none    Goals:  4 weeks   1. Patient will be Independent with Home Exercise Program   2. Patient will demonstrate with improved Posture and Body Mechanics  3. Patient will increase Cervical Range of Motion by 10%   4. Patient will decrease complaints of pain with activity to 5/10    Plan     Plan of care Certification: 10/25/2024 to 11/22/2024.     Outpatient Physical Therapy 2 times weekly for 4 weeks to include the following interventions: Therapeutic Exercise, Therapeutic Activity, Manual Therapy, and Mechanical Traction    Ilir Talbot, PT  11/11/2024

## 2024-11-15 ENCOUNTER — CLINICAL SUPPORT (OUTPATIENT)
Dept: REHABILITATION | Facility: HOSPITAL | Age: 49
End: 2024-11-15
Payer: COMMERCIAL

## 2024-11-15 DIAGNOSIS — M54.2 CERVICALGIA: Primary | ICD-10-CM

## 2024-11-15 DIAGNOSIS — M54.2 NECK PAIN, MUSCULOSKELETAL: ICD-10-CM

## 2024-11-15 PROCEDURE — 97012 MECHANICAL TRACTION THERAPY: CPT | Mod: CQ

## 2024-11-15 PROCEDURE — 97530 THERAPEUTIC ACTIVITIES: CPT | Mod: CQ

## 2024-11-15 PROCEDURE — 97110 THERAPEUTIC EXERCISES: CPT | Mod: CQ

## 2024-11-15 NOTE — PROGRESS NOTES
Name: Elif Romo  Clinic Number: [id number]    Therapy Diagnosis:   No diagnosis found.    Physician: Amina Brumfield MD    Visit Date: 11/15/2024      Physician Orders: PT Eval and Treat   Medical Diagnosis from Referral: M54.2 (ICD-10-CM) - Cervicalgia  Evaluation Date: 10/25/2024  Plan of Care Expiration: 11/22/2024  Date of Surgery: N/A  Visit # / Visits authorized: 7/ 8   FOTO: 53/ 100  NDI: 32  PTA Visit #: 1/5    Time In: 12:36 PM  Time Out: 1321 PM  Total Billable Time: 45 minutes    Precautions: Standard  Functional Level Prior to Evaluation: limited by pain    Subjective     Pt reports: absence of pain upon arrival, mild left hand tingling remains, pt. States she is glad she can turn her head more and look further; states tingling was gone for a day after traction.   She  n/a  compliant with home exercise program.  Response to previous treatment: pain free after tx.  Functional change: none    Pain: 0/10 but still reports some tingling.   Location: tingling in left hand    Objective     Elif received therapeutic exercises to develop ROM, flexibility, and posture for 20 minutes including:   NU-step x 6 mins with bilateral   Bilateral shoulder flexion stretches overhead with hands clasped 3 x 20 seconds  Shoulder Pro/ retraction x 10 reps with hands clasped in front  Shoulder retraction stretch 3 x 20 seconds  Wrist flexion and Extension stretches bilateral 3 x 20 sec. Each bilateral -with decreased tingling in hands reported.  Prone neck flexion and extension x 7 reps  Shoulder rolls (posterior) up- back -down x 10 and 10x FWD    Cervical Flexion, extension, rotation, lateral flexion x 10 reps each with instructions not to press toward any pain, to allow stretching within pain free ROM.     supervised modalities after being cleared for contradictions: Mechanical Traction:  Elif received static mechanical traction to the cervical spine at a force of 24 pounds for a total of 15 minutes. Patient  tolerated treatment well without any adverse effects.    (Not today) Elif received the following manual therapy techniques: Manual traction, Myofacial release, Soft tissue Mobilization, and Friction Massage were applied to the: bilateral anterior and lateral neck, upper to mid trap area for 10 minutes, including:  Cross friction to scalenes (NT),, Levator scap release (NT), sub- occipital release, anterior, lateral and posterior neck musculature STM and manual traction to c-spine. Upper trap stretches, neck rotation stretches.      Home Exercises Provided and Patient Education Provided     Education provided: pain free ROM Low load long duration stretches as long as they are pain free.     Written Home Exercises Provided:  11/1/24 .  Exercises were reviewed and Elif was able to demonstrate them prior to the end of the session.  Elif demonstrated  good  understanding of the education provided.     See EMR under  Pt. instructions  for exercises provided  11/1/24 .    Assessment     Elif is a 48 y.o. female referred to outpatient Physical Therapy with a medical diagnosis of cervicalgia. Patient presents with reduced pain free range of motion, radicular pain to hands in both UE, difficulty with functional activities as described in FOTO assessment.     Elif Is progressing well towards her goals.   Pt prognosis is Excellent.     Pt will continue to benefit from skilled outpatient physical therapy to address the deficits listed in the problem list box on initial evaluation, provide pt/family education and to maximize pt's level of independence in the home and community environment.     Pt's spiritual, cultural and educational needs considered and pt agreeable to plan of care and goals.     Anticipated barriers to physical therapy: none    Goals:  4 weeks   1. Patient will be Independent with Home Exercise Program   2. Patient will demonstrate with improved Posture and Body Mechanics  3. Patient will increase  Cervical Range of Motion by 10%   4. Patient will decrease complaints of pain with activity to 5/10    Plan     Plan of care Certification: 10/25/2024 to 11/22/2024.     Outpatient Physical Therapy 2 times weekly for 4 weeks to include the following interventions: Therapeutic Exercise, Therapeutic Activity, Manual Therapy, and Mechanical Traction    Ania Jack, PTA  11/15/2024

## 2024-11-18 ENCOUNTER — CLINICAL SUPPORT (OUTPATIENT)
Dept: REHABILITATION | Facility: HOSPITAL | Age: 49
End: 2024-11-18
Payer: COMMERCIAL

## 2024-11-18 DIAGNOSIS — M54.2 CERVICALGIA: Primary | ICD-10-CM

## 2024-11-18 PROCEDURE — 97110 THERAPEUTIC EXERCISES: CPT

## 2024-11-18 PROCEDURE — 97012 MECHANICAL TRACTION THERAPY: CPT

## 2024-11-18 NOTE — PROGRESS NOTES
Name: Elif Romo  Clinic Number: [id number]    Therapy Diagnosis:   Encounter Diagnosis   Name Primary?    Cervicalgia Yes       Physician: Amina Brumfield MD    Visit Date: 11/18/2024      Physician Orders: PT Eval and Treat   Medical Diagnosis from Referral: M54.2 (ICD-10-CM) - Cervicalgia  Evaluation Date: 10/25/2024  Plan of Care Expiration: 11/22/2024  Date of Surgery: N/A  Visit # / Visits authorized:  8   FOTO: 53/ 100  NDI: 32  PTA Visit #: 0/5    Time In: 12:36 PM  Time Out: 1321 PM  Total Billable Time: 45 minutes    Precautions: Standard  Functional Level Prior to Evaluation: limited by pain    Subjective     Pt reports: absence of pain upon arrival, mild left hand tingling remains but is less that before therapy  She  n/a  compliant with home exercise program.  Response to previous treatment: pain free after tx.  Functional change: none    Pain: 0/10 but still reports some tingling.   Location: tingling in left hand    Objective     Elif received therapeutic exercises to develop ROM, flexibility, and posture for 20 minutes including:   NU-step x 6 mins with bilateral   Bilateral shoulder flexion stretches overhead with hands clasped 3 x 20 seconds  Shoulder Pro/ retraction x 10 reps with hands clasped in front  Shoulder retraction stretch 3 x 20 seconds  Wrist flexion and Extension stretches bilateral 3 x 20 sec. Each bilateral -with decreased tingling in hands reported.  Prone neck flexion and extension x 7 reps  Shoulder rolls (posterior) up- back -down x 10 and 10x FWD    Cervical Flexion, extension, rotation, lateral flexion x 10 reps each with instructions not to press toward any pain, to allow stretching within pain free ROM.     supervised modalities after being cleared for contradictions: Mechanical Traction:  Elif received static mechanical traction to the cervical spine at a force of 24 pounds for a total of 15 minutes. Patient tolerated treatment well without any adverse  effects.    (Not today) Elif received the following manual therapy techniques: Manual traction, Myofacial release, Soft tissue Mobilization, and Friction Massage were applied to the: bilateral anterior and lateral neck, upper to mid trap area for 10 minutes, including:  Cross friction to scalenes (NT),, Levator scap release (NT), sub- occipital release, anterior, lateral and posterior neck musculature STM and manual traction to c-spine. Upper trap stretches, neck rotation stretches.      Home Exercises Provided and Patient Education Provided     Education provided: pain free ROM Low load long duration stretches as long as they are pain free.     Written Home Exercises Provided:  11/1/24 .  Exercises were reviewed and Elif was able to demonstrate them prior to the end of the session.  Elif demonstrated  good  understanding of the education provided.     See EMR under  Pt. instructions  for exercises provided  11/1/24 .    Assessment     Elif is a 48 y.o. female referred to outpatient Physical Therapy with a medical diagnosis of cervicalgia. Patient presents with reduced pain free range of motion, radicular pain to hands in both UE, difficulty with functional activities as described in FOTO assessment.     Elif Is progressing well towards her goals.   Pt prognosis is Excellent.     Pt will continue to benefit from skilled outpatient physical therapy to address the deficits listed in the problem list box on initial evaluation, provide pt/family education and to maximize pt's level of independence in the home and community environment.     Pt's spiritual, cultural and educational needs considered and pt agreeable to plan of care and goals.     Anticipated barriers to physical therapy: none    Goals:  4 weeks   1. Patient will be Independent with Home Exercise Program   2. Patient will demonstrate with improved Posture and Body Mechanics  3. Patient will increase Cervical Range of Motion by 10%   4. Patient  will decrease complaints of pain with activity to 5/10    Plan     Plan of care Certification: 10/25/2024 to 11/22/2024.     Outpatient Physical Therapy 2 times weekly for 4 weeks to include the following interventions: Therapeutic Exercise, Therapeutic Activity, Manual Therapy, and Mechanical Traction    Ilir Talbot, PT  11/18/2024

## 2024-11-22 ENCOUNTER — CLINICAL SUPPORT (OUTPATIENT)
Dept: REHABILITATION | Facility: HOSPITAL | Age: 49
End: 2024-11-22
Payer: COMMERCIAL

## 2024-11-22 DIAGNOSIS — M54.2 CERVICALGIA: Primary | ICD-10-CM

## 2024-11-22 PROCEDURE — 97530 THERAPEUTIC ACTIVITIES: CPT

## 2024-11-22 PROCEDURE — 97110 THERAPEUTIC EXERCISES: CPT

## 2024-11-22 NOTE — PROGRESS NOTES
Name: Elif Romo  Clinic Number: [id number]    Therapy Diagnosis:   No diagnosis found.      Physician: Amina Brumfield MD    Visit Date: 11/22/2024      Physician Orders: PT Eval and Treat   Medical Diagnosis from Referral: M54.2 (ICD-10-CM) - Cervicalgia  Evaluation Date: 10/25/2024  Plan of Care Expiration: 11/22/2024  Date of Surgery: N/A  Visit # / Visits authorized:  8   FOTO: 53/ 100  NDI: 32  PTA Visit #: 0/5    Time In: 12:35 PM  Time Out: 1:15 PM  Total Billable Time: 40 minutes    Precautions: Standard  Functional Level Prior to Evaluation: limited by pain    Subjective     Pt reports: mild pain upon arrival  She  n/a  compliant with home exercise program.  Response to previous treatment: pain free after tx.  Functional change: none    Pain: 4/10    Location: tingling in left hand    Objective     Elif received therapeutic exercises to develop ROM, flexibility, and posture for 20 minutes including:   NU-step x 6 mins with bilateral   Bilateral shoulder flexion stretches overhead with hands clasped 3 x 20 seconds  Shoulder Pro/ retraction x 10 reps with hands clasped in front  Shoulder retraction stretch 3 x 20 seconds  Wrist flexion and Extension stretches bilateral 3 x 20 sec. Each bilateral -with decreased tingling in hands reported.  Prone neck flexion and extension x 7 reps  Shoulder rolls (posterior) up- back -down x 10 and 10x FWD  Isometrics 4 way    Cervical Flexion, extension, rotation, lateral flexion x 10 reps each with instructions not to press toward any pain, to allow stretching within pain free ROM.     supervised modalities after being cleared for contradictions: Mechanical Traction:  Elif received static mechanical traction to the cervical spine at a force of 24 pounds for a total of 15 minutes. Patient tolerated treatment well without any adverse effects.    (Not today) Elif received the following manual therapy techniques: Manual traction, Myofacial release, Soft tissue  Mobilization, and Friction Massage were applied to the: bilateral anterior and lateral neck, upper to mid trap area for 10 minutes, including:  Cross friction to scalenes (NT),, Levator scap release (NT), sub- occipital release, anterior, lateral and posterior neck musculature STM and manual traction to c-spine. Upper trap stretches, neck rotation stretches.      Home Exercises Provided and Patient Education Provided     Education provided: pain free ROM Low load long duration stretches as long as they are pain free.     Written Home Exercises Provided:  11/1/24 .  Exercises were reviewed and Elif was able to demonstrate them prior to the end of the session.  Elif demonstrated  good  understanding of the education provided.     See EMR under  Pt. instructions  for exercises provided  11/1/24 .    Assessment     Discharge    Elif Is progressing well towards her goals.   Pt prognosis is Excellent.     Pt will continue to benefit from skilled outpatient physical therapy to address the deficits listed in the problem list box on initial evaluation, provide pt/family education and to maximize pt's level of independence in the home and community environment.     Pt's spiritual, cultural and educational needs considered and pt agreeable to plan of care and goals.     Anticipated barriers to physical therapy: none    Goals:  4 weeks   1. Patient will be Independent with Home Exercise Program   2. Patient will demonstrate with improved Posture and Body Mechanics  3. Patient will increase Cervical Range of Motion by 10%   4. Patient will decrease complaints of pain with activity to 5/10    Plan     Plan of care Certification: 10/25/2024 to 11/22/2024.     Outpatient Physical Therapy 2 times weekly for 4 weeks to include the following interventions: Therapeutic Exercise, Therapeutic Activity, Manual Therapy, and Mechanical Traction    Ilir Talbot, PT  11/22/2024                     E[facility]

## 2024-11-22 NOTE — PLAN OF CARE
Outpatient Therapy Discharge Summary     Name: Elif Romo  Clinic Number: [id number]    Therapy Diagnosis:   Encounter Diagnosis   Name Primary?    Cervicalgia Yes     Physician: Amina Brumfield MD    Physician Orders: PT Eval and Treat   Medical Diagnosis from Referral: M54.2 (ICD-10-CM) - Cervicalgia  Evaluation Date: 10/25/2024    Date of Last visit: 11/22/2024   Total Visits Received: 9  Cancelled Visits: 0  No Show Visits: 0    Assessment    Goals:  4 weeks   1. Patient will be Independent with Home Exercise Program   2. Patient will demonstrate with improved Posture and Body Mechanics  3. Patient will increase Cervical Range of Motion by 10%   4. Patient will decrease complaints of pain with activity to 5/10    Discharge reason: Patient has met all of his/her goals and Patient has reached the maximum rehab potential for the present time    Plan   This patient is discharged from Physical Therapy.

## 2024-11-25 ENCOUNTER — OFFICE VISIT (OUTPATIENT)
Dept: PAIN MEDICINE | Facility: CLINIC | Age: 49
End: 2024-11-25
Payer: COMMERCIAL

## 2024-11-25 VITALS
BODY MASS INDEX: 48.71 KG/M2 | HEIGHT: 61 IN | WEIGHT: 258 LBS | RESPIRATION RATE: 19 BRPM | HEART RATE: 102 BPM | DIASTOLIC BLOOD PRESSURE: 101 MMHG | SYSTOLIC BLOOD PRESSURE: 152 MMHG

## 2024-11-25 DIAGNOSIS — M54.2 CERVICALGIA: Chronic | ICD-10-CM

## 2024-11-25 DIAGNOSIS — M47.812 SPONDYLOSIS OF CERVICAL JOINT WITHOUT MYELOPATHY: Primary | Chronic | ICD-10-CM

## 2024-11-25 PROCEDURE — 99999 PR PBB SHADOW E&M-EST. PATIENT-LVL V: CPT | Mod: PBBFAC,,, | Performed by: PAIN MEDICINE

## 2024-11-25 PROCEDURE — 3044F HG A1C LEVEL LT 7.0%: CPT | Mod: ,,, | Performed by: PAIN MEDICINE

## 2024-11-25 PROCEDURE — 3077F SYST BP >= 140 MM HG: CPT | Mod: ,,, | Performed by: PAIN MEDICINE

## 2024-11-25 PROCEDURE — 99214 OFFICE O/P EST MOD 30 MIN: CPT | Mod: S$PBB,,, | Performed by: PAIN MEDICINE

## 2024-11-25 PROCEDURE — 3080F DIAST BP >= 90 MM HG: CPT | Mod: ,,, | Performed by: PAIN MEDICINE

## 2024-11-25 PROCEDURE — 1159F MED LIST DOCD IN RCRD: CPT | Mod: ,,, | Performed by: PAIN MEDICINE

## 2024-11-25 PROCEDURE — 3008F BODY MASS INDEX DOCD: CPT | Mod: ,,, | Performed by: PAIN MEDICINE

## 2024-11-25 PROCEDURE — 99215 OFFICE O/P EST HI 40 MIN: CPT | Mod: PBBFAC | Performed by: PAIN MEDICINE

## 2024-11-25 RX ORDER — METHOCARBAMOL 500 MG/1
500 TABLET, FILM COATED ORAL 4 TIMES DAILY
Qty: 40 TABLET | Refills: 0 | Status: SHIPPED | OUTPATIENT
Start: 2024-11-25 | End: 2024-12-05
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
## 2024-11-25 NOTE — PATIENT INSTRUCTIONS
YOU ARE SCHEDULED ON 12/10/2024 AT 1:15 PM FOR YOUR PROCEDURE- BILATERAL C4-5, 5-6 MBB WITH  .        Procedure Instructions:    Nothing to eat or drink for 8 hours or after midnight including gum, candy, mints, or tobacco products.  If you are scheduled for 1:30 or later nothing to eat or drink after 5 a.m. the morning of the procedure, including gum, candy, mints, or tobacco products.  Must have a  at least 18 yrs of age to stay present at all times  No Diabetic medications the morning of procedure, check blood sugar the morning of procedure, if it is greater than 200 call the office at [phone]  If you are started on antibiotics or have been prescribed antibiotics, have a fever, or have any other type of infection call to reschedule procedure.  If you take blood pressure medications you can take it at your regular scheduled time with a small sip of WATER!  Dentures are to be removed prior to procedure or we can provide you with a denture cup to remove them prior to being taken back for procedure.   False eyelashes are to be removed before the morning of procedure.    Contacts will have to be removed prior to procedure.  No jewelry is to be worn to the procedure.     HOLD ASPIRIN AND ASPIRIN PRODUCTS  (ASPIRIN, BC POWDER ETC. ) FOR 7 DAYS  PRIOR TO PROCEDURE  HOLD NSAIDS( ibuprofen, mobic, meloxicam, advil, diclofenac, naproxen, relafen, celebrex,  methotrexate, aleve etc....)  FOR 3 DAYS   PRIOR TO PROCEDURE        SIGNATURE:__________________________________________________________________________________________________

## 2024-12-09 ENCOUNTER — TELEPHONE (OUTPATIENT)
Dept: PAIN MEDICINE | Facility: CLINIC | Age: 49
End: 2024-12-09
Payer: COMMERCIAL

## 2024-12-09 NOTE — TELEPHONE ENCOUNTER
I have attempted to reach patient at [phone].  Voicemail is left instructing patient that her Insurance carrier has denied her procedure- Bilateral C4-5, 5-6 MBB with  on 12/10/24. 12/9/2024209:07 am TIFFANIE.

## 2024-12-10 ENCOUNTER — TELEPHONE (OUTPATIENT)
Dept: PAIN MEDICINE | Facility: CLINIC | Age: 49
End: 2024-12-10
Payer: COMMERCIAL

## 2024-12-10 NOTE — TELEPHONE ENCOUNTER
Patient is scheduled to see  on 12/16/2024 at 08:15 am.  Patient is notified and voices understanding. TC  ----- Message from Amina Brumfield MD sent at 12/9/2024  3:53 PM CST -----  Regarding: RE: PROCEDURE  Patient to return to clinic to be re-evaluated to determine what can be done without insurance authorization  ----- Message -----  From: César Barrow LPN  Sent: 12/9/2024   2:21 PM CST  To: Amina Brumfield MD  Subject: FW: PROCEDURE                                    Patient is continuing to have low back and neck pain, Patient stated having numbness in legs when sitting to have a bowel movement.  Insurance has denied recent procedure request.  ----- Message -----  From: Maci Bloom  Sent: 12/9/2024   1:29 PM CST  To: [facility] Staff  Subject: PROCEDURE                                        Who Called: Elif Romo        Who Left Message for Patient:  Does the patient know what this is regarding?:YES      Preferred Method of Contact: Phone Call  Patient's Preferred Phone Number on File: [phone]   Best Call Back Number, if different:  Additional Information: WOULD LIKE TO SPEAK WITH CÉSAR ABOUT HER INSURANCE DENIAL

## 2024-12-16 ENCOUNTER — OFFICE VISIT (OUTPATIENT)
Dept: PAIN MEDICINE | Facility: CLINIC | Age: 49
End: 2024-12-16
Payer: COMMERCIAL

## 2024-12-16 VITALS
WEIGHT: 256 LBS | RESPIRATION RATE: 18 BRPM | HEART RATE: 95 BPM | BODY MASS INDEX: 48.33 KG/M2 | SYSTOLIC BLOOD PRESSURE: 146 MMHG | DIASTOLIC BLOOD PRESSURE: 90 MMHG | HEIGHT: 61 IN

## 2024-12-16 DIAGNOSIS — M47.812 SPONDYLOSIS OF CERVICAL JOINT WITHOUT MYELOPATHY: Primary | ICD-10-CM

## 2024-12-16 PROCEDURE — 3008F BODY MASS INDEX DOCD: CPT | Mod: ,,, | Performed by: PAIN MEDICINE

## 2024-12-16 PROCEDURE — 99999 PR PBB SHADOW E&M-EST. PATIENT-LVL V: CPT | Mod: PBBFAC,,, | Performed by: PAIN MEDICINE

## 2024-12-16 PROCEDURE — 3077F SYST BP >= 140 MM HG: CPT | Mod: ,,, | Performed by: PAIN MEDICINE

## 2024-12-16 PROCEDURE — 3080F DIAST BP >= 90 MM HG: CPT | Mod: ,,, | Performed by: PAIN MEDICINE

## 2024-12-16 PROCEDURE — 3044F HG A1C LEVEL LT 7.0%: CPT | Mod: ,,, | Performed by: PAIN MEDICINE

## 2024-12-16 PROCEDURE — 99215 OFFICE O/P EST HI 40 MIN: CPT | Mod: PBBFAC | Performed by: PAIN MEDICINE

## 2024-12-16 PROCEDURE — 99214 OFFICE O/P EST MOD 30 MIN: CPT | Mod: S$PBB,,, | Performed by: PAIN MEDICINE

## 2024-12-16 PROCEDURE — 1159F MED LIST DOCD IN RCRD: CPT | Mod: ,,, | Performed by: PAIN MEDICINE

## 2024-12-16 RX ORDER — TRAMADOL HYDROCHLORIDE 50 MG/1
50 TABLET ORAL EVERY 12 HOURS PRN
Qty: 30 TABLET | Refills: 0 | Status: SHIPPED | OUTPATIENT
Start: 2024-12-16 | End: 2024-12-31
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
## 2024-12-19 ENCOUNTER — TELEPHONE (OUTPATIENT)
Dept: PAIN MEDICINE | Facility: CLINIC | Age: 49
End: 2024-12-19
Payer: COMMERCIAL

## 2024-12-19 NOTE — TELEPHONE ENCOUNTER
I have attempted to call patient at [phone] on 12/19/2024@08:40 am with no answer. Voicemail is left instructing patient to call our office. Patient is scheduled for Bilateral C4-6 MBB on 01/09/2025 at 1:15 pm with . TC        Procedure Instructions:    Nothing to eat or drink for 8 hours or after midnight including gum, candy, mints, or tobacco products.  If you are scheduled for 1:30 or later nothing to eat or drink after 5 a.m. the morning of the procedure, including gum, candy, mints, or tobacco products.  Must have a  at least 18 yrs of age to stay present at all times  No Diabetic medications the morning of procedure, check blood sugar the morning of procedure, if it is greater than 200 call the office at [phone]  If you are started on antibiotics or have been prescribed antibiotics, have a fever, or have any other type of infection call to reschedule procedure.  If you take blood pressure medications you can take it at your regular scheduled time with a small sip of WATER!  Dentures are to be removed prior to procedure or we can provide you with a denture cup to remove them prior to being taken back for procedure.   False eyelashes are to be removed before the morning of procedure.    Contacts will have to be removed prior to procedure.  No jewelry is to be worn to the procedure.     HOLD ASPIRIN AND ASPIRIN PRODUCTS  (ASPIRIN, BC POWDER ETC. ) FOR 7 DAYS  PRIOR TO PROCEDURE  HOLD NSAIDS( ibuprofen, mobic, meloxicam, advil, diclofenac, naproxen, relafen, celebrex,  methotrexate, aleve etc....)  FOR 3 DAYS   PRIOR TO PROCEDURE

## 2025-01-08 ENCOUNTER — TELEPHONE (OUTPATIENT)
Dept: PAIN MEDICINE | Facility: CLINIC | Age: 50
End: 2025-01-08
Payer: COMMERCIAL

## 2025-01-08 NOTE — TELEPHONE ENCOUNTER
I have spoken with patient and instructed her that her insurance carrier is still pending review for procedure- Bilateral C4-5, 5-6 MBB with  on 01/09/25. Procedure will be put on hold till approval or denial. Patient voices understanding. 01/08/2025@13:56 pm TIFFANIE.

## 2025-01-08 NOTE — TELEPHONE ENCOUNTER
Informed patient should arrive at her scheduled appt time of (9721) 1:15.       ----- Message from Yuli sent at 1/8/2025 10:22 AM CST -----  Regarding: Procedure  Who Called: Elif Romo    Caller is requesting assistance/information from provider's office.    Patient wants to know what time to arrive for her procedure scheduled at 01/09/2025 . She does work at night so if she does not answer leave a VM please.       Preferred Method of Contact: Phone Call  Patient's Preferred Phone Number on File: [phone]   Best Call Back Number, if different:  Additional Information:

## 2025-02-06 ENCOUNTER — TELEPHONE (OUTPATIENT)
Dept: PAIN MEDICINE | Facility: CLINIC | Age: 50
End: 2025-02-06
Payer: COMMERCIAL
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
## 2025-02-06 NOTE — H&P (VIEW-ONLY)
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 visit

## 2025-02-06 NOTE — TELEPHONE ENCOUNTER
----- Message from Nurse Lindsey sent at 2/6/2025  9:35 AM CST -----    ----- Message -----  From: Shawna Jenkins  Sent: 2/4/2025   3:01 PM CST  To: [facility] Staff    Who Called: Elifjeyson Wuens    Caller is requesting assistance/information from provider's office.    Pt is calling regarding nerve block. Pt states her insurance has approved her to get it done and wanted to reschedule. Pt also wanted to ask a few questions.       Preferred Method of Contact: Phone Call  Patient's Preferred Phone Number on File: [phone]   Best Call Back Number, if different:  Additional Information:    GERSON LARIOS   02/06/2025   3:38 PM   Attempted to call no answer

## 2025-02-10 ENCOUNTER — OFFICE VISIT (OUTPATIENT)
Dept: FAMILY MEDICINE | Facility: CLINIC | Age: 50
End: 2025-02-10
Payer: COMMERCIAL

## 2025-02-10 VITALS
HEIGHT: 61 IN | DIASTOLIC BLOOD PRESSURE: 86 MMHG | SYSTOLIC BLOOD PRESSURE: 132 MMHG | WEIGHT: 253.81 LBS | OXYGEN SATURATION: 98 % | TEMPERATURE: 99 F | RESPIRATION RATE: 20 BRPM | BODY MASS INDEX: 47.92 KG/M2 | HEART RATE: 84 BPM

## 2025-02-10 DIAGNOSIS — E78.2 MIXED HYPERLIPIDEMIA: ICD-10-CM

## 2025-02-10 DIAGNOSIS — Z12.4 SCREENING FOR MALIGNANT NEOPLASM OF CERVIX: ICD-10-CM

## 2025-02-10 DIAGNOSIS — Z13.220 SCREENING FOR LIPID DISORDERS: ICD-10-CM

## 2025-02-10 DIAGNOSIS — Z00.01 ENCOUNTER FOR GENERAL ADULT MEDICAL EXAMINATION WITH ABNORMAL FINDINGS: Primary | ICD-10-CM

## 2025-02-10 DIAGNOSIS — N18.4 STAGE 4 CHRONIC KIDNEY DISEASE: ICD-10-CM

## 2025-02-10 DIAGNOSIS — E89.0 POSTOPERATIVE HYPOTHYROIDISM: ICD-10-CM

## 2025-02-10 DIAGNOSIS — I15.0 RENOVASCULAR HYPERTENSION: ICD-10-CM

## 2025-02-10 DIAGNOSIS — F32.A DEPRESSION, UNSPECIFIED DEPRESSION TYPE: ICD-10-CM

## 2025-02-10 DIAGNOSIS — Z12.39 ENCOUNTER FOR SCREENING FOR MALIGNANT NEOPLASM OF BREAST, UNSPECIFIED SCREENING MODALITY: ICD-10-CM

## 2025-02-10 DIAGNOSIS — I10 PRIMARY HYPERTENSION: ICD-10-CM

## 2025-02-10 DIAGNOSIS — Z12.11 SCREENING FOR MALIGNANT NEOPLASM OF COLON: ICD-10-CM

## 2025-02-10 DIAGNOSIS — Z13.1 SCREENING FOR DIABETES MELLITUS: ICD-10-CM

## 2025-02-10 LAB
CHOLEST SERPL-MCNC: 231 MG/DL
CHOLEST/HDLC SERPL: 3.6 {RATIO}
CREAT UR-MCNC: 114 MG/DL (ref 15–325)
GLUCOSE SERPL-MCNC: 84 MG/DL (ref 70–100)
HDLC SERPL-MCNC: 65 MG/DL (ref 35–60)
LDLC SERPL CALC-MCNC: 125 MG/DL
LDLC/HDLC SERPL: 1.9 {RATIO}
MICROALBUMIN UR-MCNC: 158.5 MG/DL
MICROALBUMIN/CREAT RATIO PNL UR: 1390.4 MG/G (ref 0–30)
NONHDLC SERPL-MCNC: 166 MG/DL
TRIGL SERPL-MCNC: 203 MG/DL (ref 37–140)
TSH SERPL DL<=0.005 MIU/L-ACNC: 24.26 UIU/ML (ref 0.35–4.94)
VLDLC SERPL-MCNC: 41 MG/DL

## 2025-02-10 PROCEDURE — 99396 PREV VISIT EST AGE 40-64: CPT | Mod: ,,, | Performed by: NURSE PRACTITIONER

## 2025-02-10 PROCEDURE — 82043 UR ALBUMIN QUANTITATIVE: CPT | Mod: ,,, | Performed by: CLINICAL MEDICAL LABORATORY

## 2025-02-10 PROCEDURE — 3062F POS MACROALBUMINURIA REV: CPT | Mod: ,,, | Performed by: NURSE PRACTITIONER

## 2025-02-10 PROCEDURE — 3008F BODY MASS INDEX DOCD: CPT | Mod: ,,, | Performed by: NURSE PRACTITIONER

## 2025-02-10 PROCEDURE — 80061 LIPID PANEL: CPT | Mod: ,,, | Performed by: CLINICAL MEDICAL LABORATORY

## 2025-02-10 PROCEDURE — 3079F DIAST BP 80-89 MM HG: CPT | Mod: ,,, | Performed by: NURSE PRACTITIONER

## 2025-02-10 PROCEDURE — 1160F RVW MEDS BY RX/DR IN RCRD: CPT | Mod: ,,, | Performed by: NURSE PRACTITIONER

## 2025-02-10 PROCEDURE — 82947 ASSAY GLUCOSE BLOOD QUANT: CPT | Mod: ,,, | Performed by: CLINICAL MEDICAL LABORATORY

## 2025-02-10 PROCEDURE — 82570 ASSAY OF URINE CREATININE: CPT | Mod: ,,, | Performed by: CLINICAL MEDICAL LABORATORY

## 2025-02-10 PROCEDURE — 3075F SYST BP GE 130 - 139MM HG: CPT | Mod: ,,, | Performed by: NURSE PRACTITIONER

## 2025-02-10 PROCEDURE — 3066F NEPHROPATHY DOC TX: CPT | Mod: ,,, | Performed by: NURSE PRACTITIONER

## 2025-02-10 PROCEDURE — 84443 ASSAY THYROID STIM HORMONE: CPT | Mod: ,,, | Performed by: CLINICAL MEDICAL LABORATORY

## 2025-02-10 PROCEDURE — 1159F MED LIST DOCD IN RCRD: CPT | Mod: ,,, | Performed by: NURSE PRACTITIONER

## 2025-02-10 RX ORDER — ROSUVASTATIN CALCIUM 10 MG/1
10 TABLET, COATED ORAL DAILY
Qty: 90 TABLET | Refills: 1 | Status: SHIPPED | OUTPATIENT
Start: 2025-02-10

## 2025-02-10 RX ORDER — AMLODIPINE BESYLATE 10 MG/1
10 TABLET ORAL DAILY
Qty: 90 TABLET | Refills: 1 | Status: SHIPPED | OUTPATIENT
Start: 2025-02-10

## 2025-02-10 RX ORDER — METHOCARBAMOL 500 MG/1
500 TABLET, FILM COATED ORAL
COMMUNITY
Start: 2024-08-09 | End: 2025-02-11 | Stop reason: SDUPTHER

## 2025-02-10 RX ORDER — LEVOTHYROXINE SODIUM 125 UG/1
125 TABLET ORAL DAILY
Qty: 90 TABLET | Refills: 1 | Status: SHIPPED | OUTPATIENT
Start: 2025-02-10

## 2025-02-10 RX ORDER — HYDROXYZINE PAMOATE 50 MG/1
50 CAPSULE ORAL EVERY 8 HOURS PRN
Qty: 90 CAPSULE | Refills: 1 | Status: SHIPPED | OUTPATIENT
Start: 2025-02-10

## 2025-02-10 RX ORDER — POTASSIUM CHLORIDE 20 MEQ/1
20 TABLET, EXTENDED RELEASE ORAL
COMMUNITY
Start: 2024-08-21

## 2025-02-10 RX ORDER — FLUOXETINE HYDROCHLORIDE 40 MG/1
40 CAPSULE ORAL DAILY
Qty: 90 CAPSULE | Refills: 1 | Status: SHIPPED | OUTPATIENT
Start: 2025-02-10 | End: 2025-08-09

## 2025-02-11 ENCOUNTER — OFFICE VISIT (OUTPATIENT)
Dept: PAIN MEDICINE | Facility: CLINIC | Age: 50
End: 2025-02-11
Payer: COMMERCIAL

## 2025-02-11 ENCOUNTER — PATIENT OUTREACH (OUTPATIENT)
Facility: HOSPITAL | Age: 50
End: 2025-02-11
Payer: COMMERCIAL

## 2025-02-11 VITALS
HEIGHT: 61 IN | DIASTOLIC BLOOD PRESSURE: 91 MMHG | SYSTOLIC BLOOD PRESSURE: 148 MMHG | HEART RATE: 91 BPM | BODY MASS INDEX: 47.77 KG/M2 | RESPIRATION RATE: 16 BRPM | WEIGHT: 253 LBS

## 2025-02-11 DIAGNOSIS — G89.29 CHRONIC BILATERAL LOW BACK PAIN WITHOUT SCIATICA: Chronic | ICD-10-CM

## 2025-02-11 DIAGNOSIS — M54.50 CHRONIC BILATERAL LOW BACK PAIN WITHOUT SCIATICA: Chronic | ICD-10-CM

## 2025-02-11 DIAGNOSIS — M47.812 SPONDYLOSIS OF CERVICAL JOINT WITHOUT MYELOPATHY: Primary | Chronic | ICD-10-CM

## 2025-02-11 DIAGNOSIS — Z79.899 ENCOUNTER FOR LONG-TERM (CURRENT) USE OF MEDICATIONS: ICD-10-CM

## 2025-02-11 DIAGNOSIS — M15.0 PRIMARY OSTEOARTHRITIS INVOLVING MULTIPLE JOINTS: Chronic | ICD-10-CM

## 2025-02-11 LAB
CTP QC/QA: YES
POC (AMP) AMPHETAMINE: NEGATIVE
POC (BAR) BARBITURATES: NEGATIVE
POC (BUP) BUPRENORPHINE: NEGATIVE
POC (BZO) BENZODIAZEPINES: NEGATIVE
POC (COC) COCAINE: NEGATIVE
POC (MDMA) METHYLENEDIOXYMETHAMPHETAMINE 3,4: NEGATIVE
POC (MET) METHAMPHETAMINE: NEGATIVE
POC (MOP) OPIATES: ABNORMAL
POC (MTD) METHADONE: NEGATIVE
POC (OXY) OXYCODONE: NEGATIVE
POC (PCP) PHENCYCLIDINE: NEGATIVE
POC (TCA) TRICYCLIC ANTIDEPRESSANTS: NEGATIVE
POC TEMPERATURE (URINE): 92
POC THC: NEGATIVE

## 2025-02-11 PROCEDURE — 3080F DIAST BP >= 90 MM HG: CPT | Mod: ,,, | Performed by: PHYSICIAN ASSISTANT

## 2025-02-11 PROCEDURE — 99215 OFFICE O/P EST HI 40 MIN: CPT | Mod: PBBFAC | Performed by: PHYSICIAN ASSISTANT

## 2025-02-11 PROCEDURE — 99214 OFFICE O/P EST MOD 30 MIN: CPT | Mod: S$PBB,,, | Performed by: PHYSICIAN ASSISTANT

## 2025-02-11 PROCEDURE — 3066F NEPHROPATHY DOC TX: CPT | Mod: ,,, | Performed by: PHYSICIAN ASSISTANT

## 2025-02-11 PROCEDURE — 80305 DRUG TEST PRSMV DIR OPT OBS: CPT | Mod: PBBFAC | Performed by: PHYSICIAN ASSISTANT

## 2025-02-11 PROCEDURE — 99999 PR PBB SHADOW E&M-EST. PATIENT-LVL V: CPT | Mod: PBBFAC,,, | Performed by: PHYSICIAN ASSISTANT

## 2025-02-11 PROCEDURE — 1159F MED LIST DOCD IN RCRD: CPT | Mod: ,,, | Performed by: PHYSICIAN ASSISTANT

## 2025-02-11 PROCEDURE — 99999PBSHW POCT URINE DRUG SCREEN PRESUMP: Mod: PBBFAC,,,

## 2025-02-11 PROCEDURE — 3062F POS MACROALBUMINURIA REV: CPT | Mod: ,,, | Performed by: PHYSICIAN ASSISTANT

## 2025-02-11 PROCEDURE — 3077F SYST BP >= 140 MM HG: CPT | Mod: ,,, | Performed by: PHYSICIAN ASSISTANT

## 2025-02-11 PROCEDURE — 3008F BODY MASS INDEX DOCD: CPT | Mod: ,,, | Performed by: PHYSICIAN ASSISTANT

## 2025-02-11 RX ORDER — NALOXONE HYDROCHLORIDE 4 MG/.1ML
1 SPRAY NASAL ONCE
Qty: 1 EACH | Refills: 0 | Status: SHIPPED | OUTPATIENT
Start: 2025-02-11 | End: 2025-02-11

## 2025-02-11 RX ORDER — METHOCARBAMOL 500 MG/1
500 TABLET, FILM COATED ORAL 3 TIMES DAILY PRN
Qty: 90 TABLET | Refills: 0 | Status: SHIPPED | OUTPATIENT
Start: 2025-02-11

## 2025-02-11 RX ORDER — TRAMADOL HYDROCHLORIDE 50 MG/1
50 TABLET ORAL EVERY 12 HOURS PRN
Qty: 60 TABLET | Refills: 0 | Status: SHIPPED | OUTPATIENT
Start: 2025-02-11 | End: 2025-03-13

## 2025-02-11 NOTE — ASSESSMENT & PLAN NOTE
Clinically euthyroid. TSH obtained today. Will follow up with results and make med adjustments as needed.    No. HORACE screening performed.  STOP BANG Legend: 0-2 = LOW Risk; 3-4 = INTERMEDIATE Risk; 5-8 = HIGH Risk

## 2025-02-11 NOTE — PATIENT INSTRUCTIONS

## 2025-02-11 NOTE — PATIENT INSTRUCTIONS
"Patient Education       High Blood Pressure in Adults   The Basics   Written by the doctors and editors at [facility]   What is high blood pressure? -- High blood pressure is a condition that puts you at risk for heart attack, stroke, and kidney disease. It does not usually cause symptoms. But it can be serious.  When your doctor or nurse tells you your blood pressure, they will say 2 numbers. For instance, your doctor or nurse might say that your blood pressure is "130 over 80." The top number is the pressure inside your arteries when your heart is shay. The bottom number is the pressure inside your arteries when your heart is relaxed.  "Elevated blood pressure" is a term doctors or nurses use as a warning. People with elevated blood pressure do not yet have high blood pressure. But their blood pressure is not as low as it should be for good health.  Many experts define high, elevated, and normal blood pressure as follows:  High - Top number of 130 or above and/or bottom number of 80 or above  Elevated - Top number between 120 and 129 and bottom number of 79 or below  Normal - Top number of 119 or below and bottom number of 79 or below  This information is also in the table (table 1).   How can I lower my blood pressure? -- If your doctor or nurse has prescribed blood pressure medicine, the most important thing you can do is to take it. If it causes side effects, do not just stop taking it. Instead, talk to your doctor or nurse about the problems it causes. They might be able to lower your dose or switch you to another medicine. If cost is a problem, mention that too. They might be able to put you on a less expensive medicine. Taking your blood pressure medicine can keep you from having a heart attack or stroke, and it can save your life!  Can I do anything on my own? -- You have a lot of control over your blood pressure. To lower it:  Lose weight (if you are overweight)  Choose a diet low in fat and rich in " "fruits, vegetables, and low-fat dairy products  Reduce the amount of salt you eat  Do something active for at least 30 minutes a day on most days of the week  Cut down on alcohol (if you drink more than 2 alcoholic drinks per day)  It's also a good idea to get a home blood pressure meter. People who check their own blood pressure at home do better at keeping it low and can sometimes even reduce the amount of medicine they take.  All topics are updated as new evidence becomes available and our peer review process is complete.  This topic retrieved from [employer] on: Sep 21, 2021.  Topic 86266 Version 15.0  Release: 29.4.2 - C29.263  © 2021 UpToDate, Inc. and/or its affiliates. All rights reserved.  table 1: Definition of normal and high blood pressure  Level  Top number  Bottom number    High 130 or above 80 or above   Elevated 120 to 129 79 or below   Normal 119 or below 79 or below   These definitions are from the American College of Cardiology/American Heart Association. Other expert groups might use slightly different definitions.  "Elevated blood pressure" is a term doctor or nurses use as a warning. It means you do not yet have high blood pressure, but your blood pressure is not as low as it should be for good health.  Graphic 46168 Version 6.0  Consumer Information Use and Disclaimer   This information is not specific medical advice and does not replace information you receive from your health care provider. This is only a brief summary of general information. It does NOT include all information about conditions, illnesses, injuries, tests, procedures, treatments, therapies, discharge instructions or life-style choices that may apply to you. You must talk with your health care provider for complete information about your health and treatment options. This information should not be used to decide whether or not to accept your health care provider's advice, instructions or recommendations. Only your health care " provider has the knowledge and training to provide advice that is right for you. The use of this information is governed by the [employer] End User License Agreement, available at https://www.K[employer]ik [employer]ista [employer]/en/solutions/CableOrganizer.com/about/jp.The use of [employer] content is governed by the [employer] Terms of Use. ©2021 UpToDate, Inc. All rights reserved.  Copyright   © 2021 UpToDate, Inc. and/or its affiliates. All rights reserved.    Patient Education       Diet and Health   The Basics   Written by the doctors and editors at [facility]   Why is it important to eat a healthy diet? -- It's important to eat a healthy diet because eating the right foods can keep you healthy now and later on in life.  Which foods are especially healthy? -- Foods that are especially healthy include:  Fruits and vegetables - Eating a diet with lots of fruits and vegetables can help prevent heart disease and strokes. It might also help prevent certain types of cancers. Try to eat fruits and vegetables at each meal and also for snacks. If you don't have fresh fruits and vegetables available, you can eat frozen or canned ones instead. Doctors recommend eating at least 2 1/2 servings of vegetables and 2 servings of fruits each day.  Foods with fiber - Eating foods with a lot of fiber can help prevent heart disease and strokes. If you have type 2 diabetes, it can also help control your blood sugar. Foods that have a lot of fiber include vegetables, fruits, beans, nuts, oatmeal, and whole grain breads and cereals. You can tell how much fiber is in a food by reading the nutrition label (figure 1). Doctors recommend eating 25 to 36 grams of fiber each day.  Foods with folate (also called folic acid) - Folate is a vitamin that is important for pregnant people, since it helps prevent certain birth defects. Anyone who could get pregnant should get at least 400 micrograms of folic acid daily, whether or not they are actively trying to get pregnant. Folate  "is found in many breakfast cereals, oranges, orange juice, and green leafy vegetables.  Foods with calcium and vitamin D - Babies, children, and adults need calcium and vitamin D to help keep their bones strong. Adults also need calcium and vitamin D to help prevent osteoporosis. Osteoporosis is a condition that causes bones to get "thin" and break more easily than usual. Different foods and drinks have calcium and vitamin D in them (figure 2). People who don't get enough calcium and vitamin D in their diet might need to take a supplement.  Foods with protein - Protein helps your muscles stay strong. Healthy foods with a lot of protein include chicken, fish, eggs, beans, nuts, and soy products. Red meat also has a lot of protein, but it also contains fats, which can be unhealthy.  Some experts recommend a "Mediterranean diet." This involves eating a lot of fruits, vegetables, nuts, whole grains, and olive oil. It also includes some fish, poultry, and dairy products, but not a lot of red meat. Eating this way can help your overall health, and might even lower your risk of having a stroke.  What foods should I avoid or limit? -- To eat a healthy diet, there are some things you should avoid or limit. They include:   Fats - There are different types of fats. Some types of fats are better for your body than others.  Trans fats are especially unhealthy. They are found in margarines, many fast foods, and some store-bought baked goods. Trans fats can raise your cholesterol level and your increase your chance of getting heart disease. You should avoid eating foods with these types of fats.  The type of "polyunsaturated" fats found in fish seems to be healthy and can reduce your chance of getting heart disease. Other polyunsaturated fats might also be good for your health. When you cook, it's best to use oils with healthier fats, such as olive oil and canola oil.  Sugar - To have a healthy diet, it's important to limit or " "avoid sugar, sweets, and refined grains. Refined grains are found in white bread, white rice, most forms of pasta, and most packaged "snack" foods. Whole grains, such as whole-wheat bread and brown rice, have more fiber and are better for your health.  Avoiding sugar-sweetened beverages, like soda and sports drinks, can also help improve your health.  Red meat - Studies have shown that eating a lot of red meat can increase your risk of certain health problems, including heart disease and cancer. You should limit the amount of red meat that you eat.  Can I drink alcohol as part of a healthy diet? -- People who drink a small amount of alcohol each day might have a lower chance of getting heart disease. But drinking alcohol can lead to problems. For example, it can raise a person's chances of getting liver disease and certain types of cancers. In women, even 1 drink a day can increase the risk of getting breast cancer.  Most doctors recommend that adult women not have more than 1 drink a day and that adult men not have more than 2 drinks a day. The limits are different because most women's bodies take longer to break down alcohol.  How many calories do I need each day? -- The number of calories you need each day depends on your weight, height, age, sex, and how active you are.  Your doctor or nurse can tell you how many calories you should eat each day. If you are trying to lose weight, you should eat fewer calories each day.  What if I have questions? -- If you have questions about which foods you should or should not eat, ask your doctor or nurse. The right diet for you will depend, in part, on your health and any medical conditions you have.  All topics are updated as new evidence becomes available and our peer review process is complete.  This topic retrieved from [employer] on: Sep 21, 2021.  Topic 41534 Version 20.0  Release: 29.4.2 - C29.263  © 2021 UpToDate, Inc. and/or its affiliates. All rights " "reserved.  figure 1: Nutrition label - fiber     This is an example of a nutrition label. To figure out how much fiber is in a food, look for the line that says "Dietary Fiber." It's also important to look at the serving size. This food has 7 grams of fiber in each serving, and each serving is 1 cup.  Graphic 20291 Version 7.0    figure 2: Foods and drinks with calcium and vitamin D     Foods rich in calcium include ice cream, soy milk, breads, kale, broccoli, milk, cheese, cottage cheese, almonds, yogurt, ready-to-eat cereals, beans, and tofu. Foods rich in vitamin D include milk, fortified plant-based "milks" (soy, almond), canned tuna fish, cod liver oil, yogurt, ready-to-eat-cereals, cooked salmon, canned sardines, mackerel, and eggs. Some of these foods are rich in both.  Graphic 59458 Version 4.0    Consumer Information Use and Disclaimer   This information is not specific medical advice and does not replace information you receive from your health care provider. This is only a brief summary of general information. It does NOT include all information about conditions, illnesses, injuries, tests, procedures, treatments, therapies, discharge instructions or life-style choices that may apply to you. You must talk with your health care provider for complete information about your health and treatment options. This information should not be used to decide whether or not to accept your health care provider's advice, instructions or recommendations. Only your health care provider has the knowledge and training to provide advice that is right for you. The use of this information is governed by the [employer] End User License Agreement, available at https://www.Cogniscan.[employer]/en/solutions/MindQuilt/about/jp.The use of [employer] content is governed by the [employer] Terms of Use. ©2021 UpToDate, Inc. All rights reserved.  Copyright   © 2021 UpToDate, Inc. and/or its affiliates. All rights reserved.    "

## 2025-02-11 NOTE — PROGRESS NOTES
Her urine creatinine/microalbumin was very high. She needs to keep tight control on her HTN to prevent further damage. She is already followed by nephrology. Keep appts as scheduled. Her TSH was very high. She is supposed to be taking Levothyroxine 125mcg daily. Please talk with her and see if she is actually taking this and taking correctly (first thing in AM 30min-1hr before any other meds or food). If so, we will need to increase dosage. Cholesterol elevated but I am not sure she was fasting. Make sure she is taking atorvastatin as ordered and following low fat/low cholesterol diet.

## 2025-02-11 NOTE — PROGRESS NOTES
Population Health Chart Review & Patient Outreach Details  Post visit Population Health review of encounter with date of service  2/10 with Gaitan.  All required HY components in encounter.        Further Action Needed If Patient Returns Outreach:            Updates Requested / Reviewed:     []  Care Everywhere    []     []  External Sources ([facility], [facility], [facility], etc.)    [] [facility]   [] [facility]   [] Other:    []  Care Team Updated   []  Removed  or Duplicate Orders   []  Immunization Reconciliation Completed / Queried    [] [place]   [] [place]   [] [place]   [] [place]      Health Maintenance Topics Addressed and Outreach Outcomes / Actions Taken:             Breast Cancer Screening []  Mammogram Order Placed    []  Mammogram Screening Scheduled    []  External Records Requested & Care Team Updated if Applicable    []  External Records Uploaded & Care Team Updated if Applicable    []  Pt Declined Scheduling Mammogram    []  Pt Will Schedule with External Provider / Order Routed & Care Team Updated if Applicable              Cervical Cancer Screening []  Pap Smear Scheduled in Primary Care or OBGYN    []  External Records Requested & Care Team Updated if Applicable       []  External Records Uploaded, Care Team Updated, & History Updated if Applicable    []  Patient Declined Scheduling Pap Smear    []  Patient Will Schedule with External Provider & Care Team Updated if Applicable                  Colorectal Cancer Screening []  Colonoscopy Case Request / Referral / Home Test Order Placed    []  External Records Requested & Care Team Updated if Applicable    []  External Records Uploaded, Care Team Updated, & History Updated if Applicable    []  Patient Declined Completing Colon Cancer Screening    []  Patient Will Schedule with External Provider & Care Team Updated if Applicable    []  Fit Kit Mailed (add the SmartPhrase under additional notes)    []  Reminded Patient to Complete Home Test                 Diabetic Eye Exam []  Eye Exam Screening Order Placed    []  Eye Camera Scheduled or Optometry/Ophthalmology Referral Placed    []  External Records Requested & Care Team Updated if Applicable    []  External Records Uploaded, Care Team Updated, & History Updated if Applicable    []  Patient Declined Scheduling Eye Exam    []  Patient Will Schedule with External Provider & Care Team Updated if Applicable             Blood Pressure Control []  Primary Care Follow Up Visit Scheduled     []  Remote Blood Pressure Reading Captured    []  Patient Declined Remote Reading or Scheduling Appt - Escalated to PCP    []  Patient Will Call Back or Send Portal Message with Reading                 HbA1c & Other Labs []  Overdue Lab(s) Ordered    []  Overdue Lab(s) Scheduled    []  External Records Uploaded & Care Team Updated if Applicable    []  Primary Care Follow Up Visit Scheduled     []  Reminded Patient to Complete A1c Home Test    []  Patient Declined Scheduling Labs or Will Call Back to Schedule    []  Patient Will Schedule with External Provider / Order Routed, & Care Team Updated if Applicable           Primary Care Appointment []  Primary Care Appt Scheduled    []  Patient Declined Scheduling or Will Call Back to Schedule    []  Pt Established with External Provider, Updated Care Team, & Informed Pt to Notify Payor if Applicable           Medication Adherence /    Statin Use []  Primary Care Appointment Scheduled    []  Patient Reminded to  Prescription    []  Patient Declined, Provider Notified if Needed    []  Sent Provider Message to Review to Evaluate Pt for Statin, Add Exclusion Dx Codes, Document   Exclusion in Problem List, Change Statin Intensity Level to Moderate or High Intensity if Applicable                Osteoporosis Screening []  Dexa Order Placed    []  Dexa Appointment Scheduled    []  External Records Requested & Care Team Updated    []  External Records Uploaded, Care Team  Updated, & History Updated if Applicable    []  Patient Declined Scheduling Dexa or Will Call Back to Schedule    []  Patient Will Schedule with External Provider / Order Routed & Care Team Updated if Applicable       Additional Notes:

## 2025-02-11 NOTE — ASSESSMENT & PLAN NOTE
Currently adequately controlled. Continue current meds and follow DASH diet. Follow up with Nephrology as scheduled.

## 2025-02-11 NOTE — ASSESSMENT & PLAN NOTE
Followed by Nephrology, Dr Urrutia with recent visit. Urine microalbumin/creatinine obtained today.

## 2025-02-11 NOTE — PROGRESS NOTES
Subjective     Patient ID: Elif Romo is a 49 y.o. female.    Chief Complaint: Healthy You (Patient here for healthy you. She is fasting for labs. ), Follow-up (Patient is followed by Dr. Parada, nephrologist. Recent renal panel completed. TSH pended for recheck ), and Health Maintenance (Hepatitis C Screening Never done/Cervical Cancer Screening Never done/Annual UACr Never done/TETANUS VACCINE Never done/Mammogram Never done/Colorectal Cancer Screening Never done/COVID-19 Vaccine(3 - 2024-25 season) due on 09/01/2024/Shingles Vaccine(1 of 2) Never done/Pneumococcal Vaccines (Age 50+)(1 of 1 - PCV) Never done/ /Mammogram,Colonscopy and Cervical cancer screen peneded)    49 year old female presents to clinic for Healthy You visit.   Patient is followed by Dr. Parada, nephrologist. Recent renal panel completed. TSH pended for recheck   She is due for some screening and is agreeable to both Colonoscopy and is requesting referral to OBGYN for Cervical Cancer screening.       Review of Systems   Constitutional:  Negative for activity change, appetite change, fatigue and fever.   HENT:  Negative for nasal congestion, ear pain, rhinorrhea, sinus pressure/congestion and sore throat.    Eyes:  Negative for pain, redness, visual disturbance and eye dryness.   Respiratory:  Negative for cough and shortness of breath.    Cardiovascular:  Negative for chest pain and leg swelling.   Gastrointestinal:  Negative for abdominal distention, abdominal pain, constipation and diarrhea.   Endocrine: Negative for cold intolerance, heat intolerance and polyuria.   Genitourinary:  Negative for bladder incontinence, dysuria, frequency and urgency.   Musculoskeletal:  Negative for arthralgias, gait problem and myalgias.   Integumentary:  Negative for color change, rash and wound.   Allergic/Immunologic: Negative for environmental allergies and food allergies.   Neurological:  Negative for dizziness, weakness, light-headedness and  headaches.   Psychiatric/Behavioral:  Negative for behavioral problems and sleep disturbance.        Tobacco Use: Low Risk  (2/11/2025)    Patient History     Smoking Tobacco Use: Never     Smokeless Tobacco Use: Never     Passive Exposure: Past     Review of patient's allergies indicates:   Allergen Reactions    Bactrim [sulfamethoxazole-trimethoprim] Hives    Ceclor [cefaclor] Hives     Current Outpatient Medications   Medication Instructions    amLODIPine (NORVASC) 10 mg, Oral, Daily    cholecalciferol (vitamin D3) (VITAMIN D3) 1,000 Units, Oral, Daily    FLUoxetine 40 mg, Oral, Daily    hydrOXYzine pamoate (VISTARIL) 50 mg, Oral, Every 8 hours PRN    ipratropium (ATROVENT) 21 mcg (0.03 %) nasal spray 2 sprays, Each Nostril, 2 times daily    LASIX 40 mg, Daily    levothyroxine (EUTHYROX) 125 mcg, Oral, Daily    LIDOcaine (LIDODERM) 5 % 1 patch, Transdermal, Daily, Remove & Discard patch within 12 hours    methocarbamoL (ROBAXIN) 500 mg    potassium chloride SA (K-DUR,KLOR-CON) 20 MEQ tablet 20 mEq    rosuvastatin (CRESTOR) 10 mg, Oral, Daily     Medications Discontinued During This Encounter   Medication Reason    calcium acetate,phosphat bind, (PHOSLO) 667 mg capsule Patient no longer taking    hydrOXYzine pamoate (VISTARIL) 50 MG Cap Reorder    rosuvastatin (CRESTOR) 10 MG tablet Reorder    amLODIPine (NORVASC) 10 MG tablet Reorder    FLUoxetine 40 MG capsule Reorder    levothyroxine (EUTHYROX) 125 MCG tablet Reorder       Past Medical History:   Diagnosis Date    Anxiety     Depression     Hyperlipidemia     Hypertension     Hypothyroidism      Health Maintenance Topics with due status: Not Due       Topic Last Completion Date    Hemoglobin A1c (Diabetic Prevention Screening) 02/12/2024    Annual UACr 02/10/2025    Lipid Panel 02/10/2025    RSV Vaccine (Age 60+ and Pregnant patients) Not Due     Immunization History   Administered Date(s) Administered    COVID-19, MRNA, LN-S, PF (MODERNA FULL 0.5 ML DOSE)  "07/09/2021, 08/06/2021    Influenza 11/01/2021, 10/05/2022    Influenza - Quadrivalent - PF *Preferred* (6 months and older) 10/20/2022, 10/31/2023    Influenza - Trivalent - Fluarix, Flulaval, Fluzone, Afluria - PF 11/01/2024       Objective     Body mass index is 47.96 kg/m².  Wt Readings from Last 3 Encounters:   02/10/25 115.1 kg (253 lb 12.8 oz)   12/16/24 116.1 kg (256 lb)   11/25/24 117 kg (258 lb)     Ht Readings from Last 3 Encounters:   02/10/25 5' 1" (1.549 m)   12/16/24 5' 1" (1.549 m)   11/25/24 5' 1" (1.549 m)     BP Readings from Last 3 Encounters:   02/10/25 132/86   12/16/24 (!) 146/90   11/25/24 (!) 152/101     Temp Readings from Last 3 Encounters:   02/10/25 98.5 °F (36.9 °C) (Oral)   08/09/24 98.6 °F (37 °C) (Oral)   07/15/24 98.1 °F (36.7 °C) (Oral)     Pulse Readings from Last 3 Encounters:   02/10/25 84   12/16/24 95   11/25/24 102     Resp Readings from Last 3 Encounters:   02/10/25 20   12/16/24 18   11/25/24 19     PF Readings from Last 3 Encounters:   No data found for PF       Physical Exam  Vitals and nursing note reviewed.   Constitutional:       Appearance: She is obese.   HENT:      Head: Normocephalic.      Nose: Nose normal.      Mouth/Throat:      Mouth: Mucous membranes are moist.      Pharynx: Oropharynx is clear. No posterior oropharyngeal erythema.   Eyes:      Conjunctiva/sclera: Conjunctivae normal.   Cardiovascular:      Rate and Rhythm: Normal rate and regular rhythm.      Pulses: Normal pulses.      Heart sounds: Normal heart sounds.   Pulmonary:      Effort: Pulmonary effort is normal.      Breath sounds: Normal breath sounds.   Abdominal:      General: Abdomen is flat. Bowel sounds are normal. There is no distension.      Palpations: Abdomen is soft.   Musculoskeletal:         General: No swelling or tenderness. Normal range of motion.      Cervical back: Normal range of motion.      Right lower leg: No edema.      Left lower leg: No edema.   Skin:     General: Skin is " warm and dry.      Capillary Refill: Capillary refill takes less than 2 seconds.   Neurological:      Mental Status: She is alert. Mental status is at baseline.   Psychiatric:         Mood and Affect: Mood normal.         Behavior: Behavior normal.         Assessment and Plan     Problem List Items Addressed This Visit          Psychiatric    Depression     Well controlled on Fluoxetine. Continue at current dosage. Follow up in 3 months or as needed.          Relevant Medications    FLUoxetine 40 MG capsule       Cardiac/Vascular    Hypertension    Relevant Medications    amLODIPine (NORVASC) 10 MG tablet    Hyperlipidemia     Lipid panel obtained at today's visit. Goal LDL is less than 70. Continue current meds and low fat/low cholesterol diet with increased exercise as tolerated. Will follow up with labs. Patient to follow up in 3 months or as needed.            Relevant Medications    rosuvastatin (CRESTOR) 10 MG tablet    Renovascular hypertension     Currently adequately controlled. Continue current meds and follow DASH diet. Follow up with Nephrology as scheduled.             Renal/    Stage 4 chronic kidney disease     Followed by Nephrology, Dr Urrutia with recent visit. Urine microalbumin/creatinine obtained today.          Relevant Orders    Microalbumin/Creatinine Ratio, Urine (Completed)       Endocrine    Hypothyroidism     Clinically euthyroid. TSH obtained today. Will follow up with results and make med adjustments as needed.          Relevant Medications    levothyroxine (EUTHYROX) 125 MCG tablet    Other Relevant Orders    TSH (Completed)     Other Visit Diagnoses       Encounter for general adult medical examination with abnormal findings    -  Primary    Screening for lipid disorders        Relevant Orders    Lipid Panel (Completed)    Screening for diabetes mellitus        Relevant Orders    Glucose, Fasting (Completed)    Screening for malignant neoplasm of cervix        Relevant Orders     Ambulatory referral/consult to Gynecology    Screening for malignant neoplasm of colon        Relevant Orders    Colonoscopy    Encounter for screening for malignant neoplasm of breast, unspecified screening modality        Relevant Orders    Mammo Digital Screening Bilat w/ Ye            Plan:   Instructed patient to keep appts as scheduled.   Instructed on DASH diet and increased exercise. Recommended at least 150 minutes a week with resistance training as tolerated. Monitor weight and try to lose some.   Instructed on importance of taking all medications as prescribed.   Discussed yearly dental and eye exams.    Discussed use of sun screen, helmets and seat belts.  Gun safety discussed  Sleep discussed  Stay away from tobacco products          I have reviewed the medications, allergies, and problem list.     Goal Actions:    What type of visit is the patient here for today?: Healthy You  Does the patient consent to enroll in [facility]?: Yes  Is this a Wellness Follow Up?: No  What is your overall wellness goal? (select at least one): Improve overall health  Choose 3: Exercise, Nutrition, Lifestyle  Lifestyle Actions : Take medications as prescribed  Nurtrition Actions: drink 8-10 glasses of water per day  Exercise Actions: Recommend physical activity 30 minutes per day 3-5 times/week

## 2025-02-17 ENCOUNTER — HOSPITAL ENCOUNTER (OUTPATIENT)
Dept: RADIOLOGY | Facility: HOSPITAL | Age: 50
Discharge: HOME | End: 2025-02-17
Attending: NURSE PRACTITIONER
Payer: COMMERCIAL

## 2025-02-17 VITALS — HEIGHT: 61 IN | BODY MASS INDEX: 47.2 KG/M2 | WEIGHT: 250 LBS

## 2025-02-17 DIAGNOSIS — Z12.39 ENCOUNTER FOR SCREENING FOR MALIGNANT NEOPLASM OF BREAST, UNSPECIFIED SCREENING MODALITY: ICD-10-CM

## 2025-02-17 PROCEDURE — 77063 BREAST TOMOSYNTHESIS BI: CPT | Mod: TC

## 2025-02-18 ENCOUNTER — RESULTS FOLLOW-UP (OUTPATIENT)
Dept: FAMILY MEDICINE | Facility: CLINIC | Age: 50
End: 2025-02-18

## 2025-02-18 NOTE — PROGRESS NOTES
Please let patient know that due to the dense tissue in her breast the Mammogram was inconclusive. Radiology recommends she has diagnostic mammo with possible ultrasound if indicated. Please discuss with patient and see if she is good with me ordering this.

## 2025-02-18 NOTE — PROGRESS NOTES
MsEna Elif HEIDI Romo is aware of results and aware of appointments scheduled for 2/21/2025 for diagnostic mammogram and ultrasound. She has confirmed her plans to be present for said appointments and is agreeable with further testing.

## 2025-02-25 ENCOUNTER — ANESTHESIA EVENT (OUTPATIENT)
Dept: PAIN MEDICINE | Facility: HOSPITAL | Age: 50
End: 2025-02-25
Payer: COMMERCIAL

## 2025-02-25 ENCOUNTER — HOSPITAL ENCOUNTER (OUTPATIENT)
Facility: HOSPITAL | Age: 50
Discharge: HOME | End: 2025-02-25
Attending: PAIN MEDICINE | Admitting: PAIN MEDICINE
Payer: COMMERCIAL

## 2025-02-25 ENCOUNTER — ANESTHESIA (OUTPATIENT)
Dept: PAIN MEDICINE | Facility: HOSPITAL | Age: 50
End: 2025-02-25
Payer: COMMERCIAL

## 2025-02-25 VITALS
HEART RATE: 83 BPM | DIASTOLIC BLOOD PRESSURE: 87 MMHG | HEIGHT: 61 IN | OXYGEN SATURATION: 100 % | WEIGHT: 244 LBS | TEMPERATURE: 98 F | BODY MASS INDEX: 46.07 KG/M2 | SYSTOLIC BLOOD PRESSURE: 140 MMHG | RESPIRATION RATE: 8 BRPM

## 2025-02-25 DIAGNOSIS — M47.812 SPONDYLOSIS OF CERVICAL JOINT WITHOUT MYELOPATHY: ICD-10-CM

## 2025-02-25 PROCEDURE — 63600175 PHARM REV CODE 636 W HCPCS: Performed by: NURSE ANESTHETIST, CERTIFIED REGISTERED

## 2025-02-25 PROCEDURE — 37000009 HC ANESTHESIA EA ADD 15 MINS: Performed by: PAIN MEDICINE

## 2025-02-25 PROCEDURE — 64490 INJ PARAVERT F JNT C/T 1 LEV: CPT | Mod: 50,,, | Performed by: PAIN MEDICINE

## 2025-02-25 PROCEDURE — 64491 INJ PARAVERT F JNT C/T 2 LEV: CPT | Mod: 50 | Performed by: PAIN MEDICINE

## 2025-02-25 PROCEDURE — D9220A PRA ANESTHESIA: Mod: ,,, | Performed by: NURSE ANESTHETIST, CERTIFIED REGISTERED

## 2025-02-25 PROCEDURE — 64490 INJ PARAVERT F JNT C/T 1 LEV: CPT | Mod: 50 | Performed by: PAIN MEDICINE

## 2025-02-25 PROCEDURE — 27000284 HC CANNULA NASAL: Performed by: NURSE ANESTHETIST, CERTIFIED REGISTERED

## 2025-02-25 PROCEDURE — 64491 INJ PARAVERT F JNT C/T 2 LEV: CPT | Mod: 50,,, | Performed by: PAIN MEDICINE

## 2025-02-25 PROCEDURE — 25000003 PHARM REV CODE 250: Performed by: PAIN MEDICINE

## 2025-02-25 PROCEDURE — 37000008 HC ANESTHESIA 1ST 15 MINUTES: Performed by: PAIN MEDICINE

## 2025-02-25 PROCEDURE — 63600175 PHARM REV CODE 636 W HCPCS: Performed by: PAIN MEDICINE

## 2025-02-25 RX ORDER — SODIUM CHLORIDE 9 MG/ML
INJECTION, SOLUTION INTRAVENOUS CONTINUOUS
Status: DISCONTINUED | OUTPATIENT
Start: 2025-02-25 | End: 2025-02-25 | Stop reason: HOSPADM

## 2025-02-25 RX ORDER — PROPOFOL 10 MG/ML
INJECTION, EMULSION INTRAVENOUS
Status: DISCONTINUED | OUTPATIENT
Start: 2025-02-25 | End: 2025-02-25

## 2025-02-25 RX ORDER — BUPIVACAINE HYDROCHLORIDE 2.5 MG/ML
INJECTION, SOLUTION INFILTRATION; PERINEURAL CODE/TRAUMA/SEDATION MEDICATION
Status: DISCONTINUED | OUTPATIENT
Start: 2025-02-25 | End: 2025-02-25 | Stop reason: HOSPADM

## 2025-02-25 RX ORDER — TRIAMCINOLONE ACETONIDE 40 MG/ML
INJECTION, SUSPENSION INTRA-ARTICULAR; INTRAMUSCULAR CODE/TRAUMA/SEDATION MEDICATION
Status: DISCONTINUED | OUTPATIENT
Start: 2025-02-25 | End: 2025-02-25 | Stop reason: HOSPADM

## 2025-02-25 RX ORDER — LIDOCAINE HYDROCHLORIDE 20 MG/ML
INJECTION, SOLUTION EPIDURAL; INFILTRATION; INTRACAUDAL; PERINEURAL
Status: DISCONTINUED | OUTPATIENT
Start: 2025-02-25 | End: 2025-02-25

## 2025-02-25 RX ADMIN — PROPOFOL 100 MG: 10 INJECTION, EMULSION INTRAVENOUS at 03:02

## 2025-02-25 RX ADMIN — PROPOFOL 50 MG: 10 INJECTION, EMULSION INTRAVENOUS at 03:02

## 2025-02-25 RX ADMIN — LIDOCAINE HYDROCHLORIDE 100 MG: 20 INJECTION, SOLUTION EPIDURAL; INFILTRATION; INTRACAUDAL; PERINEURAL at 03:02

## 2025-02-25 RX ADMIN — SODIUM CHLORIDE: 9 INJECTION, SOLUTION INTRAVENOUS at 03:02

## 2025-02-25 NOTE — TRANSFER OF CARE
"Anesthesia Transfer of Care Note    Patient: Elif Romo    Procedure(s) Performed: Procedure(s) (LRB):  Block-nerve-medial branch-cervical C4-6 (Bilateral)    Patient location: PACU    Anesthesia Type: general    Transport from OR: Transported from OR on room air with adequate spontaneous ventilation    Post pain: adequate analgesia    Post assessment: no apparent anesthetic complications    Post vital signs: stable    Level of consciousness: sedated    Nausea/Vomiting: no nausea/vomiting    Complications: none    Transfer of care protocol was followed      Last vitals: Visit Vitals  BP (!) 141/63   Pulse 88   Temp 36.7 °C (98 °F) (Skin)   Resp 18   Ht 5' 1" (1.549 m)   Wt 110.7 kg (244 lb)   LMP  (LMP Unknown)   SpO2 (!) 94%   BMI 46.10 kg/m²     "

## 2025-02-25 NOTE — ANESTHESIA PREPROCEDURE EVALUATION
02/25/2025  Elif Romo is a 49 y.o., female.      Pre-op Assessment    I have reviewed the Patient Summary Reports.    I have reviewed the NPO Status.   I have reviewed the Medications.     Review of Systems  Anesthesia Hx:  No problems with previous Anesthesia                Social:  No Alcohol Use, Non-Smoker       Cardiovascular:     Hypertension           hyperlipidemia                               Pulmonary:    Asthma                    Renal/:  Chronic Renal Disease                Musculoskeletal:  Arthritis          Spine Disorders: cervical            Endocrine:   Hypothyroidism        Morbid Obesity / BMI > 40  Psych:  Psychiatric History anxiety                 Physical Exam  General: Well nourished, Cooperative and Alert    Airway:  Mallampati: IV / III  Mouth Opening: Normal        Anesthesia Plan  Type of Anesthesia, risks & benefits discussed:    Anesthesia Type: MAC  Intra-op Monitoring Plan: Standard ASA Monitors  Post Op Pain Control Plan: multimodal analgesia  Induction:  IV  Informed Consent: Informed consent signed with the Patient and all parties understand the risks and agree with anesthesia plan.  All questions answered.   ASA Score: 3  Day of Surgery Review of History & Physical: H&P Update referred to the surgeon/provider.I have interviewed and examined the patient. I have reviewed the patient's H&P dated: There are no significant changes.     Ready For Surgery From Anesthesia Perspective.     .

## 2025-02-25 NOTE — BRIEF OP NOTE
The  Discharge Note  Short Stay    Admit Date: 2/25/2025    Discharge Date: 2/25/2025    Attending Physician: Amina Brumfield     Discharge Provider: Amina Brumfield    Diagnosis:  Cervical spondylosis without myelopathy    Procedure performed:  Bilateral C4-5, C5-6 medial branch block under fluoroscopy    Findings: Procedure tolerated well and without complications. Consistent with diagnosis.    EBL: 0cc    Specimens: None    Discharged Condition: Good    Final Diagnoses: Spondylosis of cervical joint without myelopathy [M47.812]    Disposition: Home or Self Care    Hospital Course: No complications, uneventful    Outcome of Hospitalization, Treatment, Procedure, or Surgery:  Patient was admitted for outpatient interventional pain management procedure. The patient tolerated the procedure well with no complications.    Follow up/Patient Instructions:  Follow up as scheduled in Pain Management office in 3-4 weeks.  Patient has received instructions and follow up date and time.    Medications:  Continue previous medications

## 2025-02-25 NOTE — ANESTHESIA POSTPROCEDURE EVALUATION
Anesthesia Post Evaluation    Patient: Elif Romo    Procedure(s) Performed: Procedure(s) (LRB):  Block-nerve-medial branch-cervical C4-6 (Bilateral)    Final Anesthesia Type: MAC      Patient location during evaluation: PACU  Patient participation: Yes- Able to Participate  Level of consciousness: awake and alert  Post-procedure vital signs: reviewed and stable  Pain management: adequate  Airway patency: patent    PONV status at discharge: No PONV  Anesthetic complications: no      Cardiovascular status: blood pressure returned to baseline  Respiratory status: unassisted  Hydration status: euvolemic  Follow-up not needed.              Vitals Value Taken Time   /85 02/25/25 15:48   Temp 36.7 °C (98 °F) 02/25/25 15:34   Pulse 86 02/25/25 15:50   Resp 10 02/25/25 15:50   SpO2 98 % 02/25/25 15:50   Vitals shown include unfiled device data.      No case tracking events are documented in the log.      Pain/Donavon Score: Donavon Score: 9 (2/25/2025  3:34 PM)

## 2025-02-25 NOTE — DISCHARGE SUMMARY
[facility] - Pain Management  Discharge Note  Short Stay    Procedure(s) (LRB):  Block-nerve-medial branch-cervical C4-6 (Bilateral)      OUTCOME: Patient tolerated treatment/procedure well without complication and is now ready for discharge.    DISPOSITION: Home or Self Care    FINAL DIAGNOSIS:  Cervical spondylosis without myelopathy    FOLLOWUP: In clinic    DISCHARGE INSTRUCTIONS:  See nurse's notes     TIME SPENT ON DISCHARGE: 5 minutes

## 2025-02-25 NOTE — PLAN OF CARE
Plan:  D/c pt via wheelchair at 1615  Informed pt if does not void in 8 hours to go to ER. Notify if redness, drainage, from injection site or fever over next 3-4 days. Rest and drink plenty of fluids for the remainder of the day. No lifting over 5 lbs. For the remainder of the day. Continue regular medications as prescribed. May take pain medications as prescribed.     Pain improved 100%  Pre-procedure pain:6  Post-procedure pain: 0

## 2025-02-25 NOTE — LETTER
"   Elif "Elif" Clarissa was seen and treated in our department on 2/25/2025.       If you have any questions or concerns, please don't hesitate to call.      Thanks,      [facility] "

## 2025-02-25 NOTE — OP NOTE
Procedure date: 2/25/2025    Procedure:  Cervical Medial Branch @ bilateral C4-5, C5-6 medial branch block with Fluoroscopic Guidance     Indication: Patient failed conservative therapy    Pre-op diagnosis: Cervical spondylosis    Post-op diagnosis: same    Physician: LARISA Brumfield MD    Medications injected:  bupivacaine 0.25%, kenalog 40mg    Local anesthetic used: 1% lidocaine subcutaneous    Anesthesia: MAC    Estimated blood loss: Less than 1cc    IVF: Per anesthesia    Complications: None    Technique: The patient was interviewed in the holding area and Risks/Benefits were discussed, including, but not limited to, the possibility of new or different pain, bleeding or infection.  All questions were answered.  The patient understood and accepted risks.  Consent was reviewed and signed.  A time-out was taken to identify patient and procedure side prior to starting the procedure.  The patient was brought into the operating room and placed in prone position and prepped and draped in the usual fashion using ChloraPrep and sterile towels. The procedure was performed using strict aseptic techniques.  AP fluoroscopy was used to identify the waists of the mid-articular pillars of the bilateral  C4-5, C5-6.  1% Lidocaine was used via a 25 Gauge needle for skin infiltration.  Under AP fluoroscopic guidance, a 25 gauge 3.5 inch spinal needle was advanced to the anatomic location of the midsection of the lateral masses (or in the case of third occipital nerve, to the joint of C2/C3).  Once os was encountered, lateral fluoroscopic views were obtained to ensure that needles did not cross into the neural foramina.  After heme-negative aspiration,  0.5 cc from a  mixture of  (0.25% marcaine 1cc and 40mg kenalog)  was injected at each of the above targeted points corresponding to the locations of the targeted medial branch nerves. The needles were removed and a sterile dressing was applied to each puncture site.    The patient  tolerated the procedure well and was transferred to the .A.C.. in stable condition.  The patient was monitored after the procedure.  The patient will be contacted tomorrow to determine the extent of pain relief.  The patient was given post procedure and discharge instructions to follow at home.  The patient was discharged in a stable condition with an adult

## 2025-02-26 ENCOUNTER — HOSPITAL ENCOUNTER (OUTPATIENT)
Dept: RADIOLOGY | Facility: HOSPITAL | Age: 50
Discharge: HOME | End: 2025-02-26
Attending: RADIOLOGY
Payer: COMMERCIAL

## 2025-02-26 DIAGNOSIS — R92.8 ABNORMAL SCREENING MAMMOGRAM: ICD-10-CM

## 2025-02-26 PROCEDURE — 77061 BREAST TOMOSYNTHESIS UNI: CPT | Mod: TC,RT

## 2025-02-26 PROCEDURE — 76642 ULTRASOUND BREAST LIMITED: CPT | Mod: 26,RT,, | Performed by: RADIOLOGY

## 2025-02-26 PROCEDURE — 77061 BREAST TOMOSYNTHESIS UNI: CPT | Mod: 26,RT,, | Performed by: RADIOLOGY

## 2025-02-26 PROCEDURE — 77065 DX MAMMO INCL CAD UNI: CPT | Mod: 26,RT,, | Performed by: RADIOLOGY

## 2025-02-26 PROCEDURE — 76642 ULTRASOUND BREAST LIMITED: CPT | Mod: TC,RT

## 2025-03-04 ENCOUNTER — TELEPHONE (OUTPATIENT)
Dept: SURGERY | Facility: CLINIC | Age: 50
End: 2025-03-04
Payer: COMMERCIAL

## 2025-03-05 NOTE — PROGRESS NOTES
Subjective:         Patient ID: Elif Romo is a 49 y.o. female.    Chief Complaint: Neck Pain      Pain  This is a chronic problem. The current episode started more than 1 year ago. The problem occurs daily. The problem has been waxing and waning. Associated symptoms include arthralgias and neck pain. Pertinent negatives include no anorexia, change in bowel habit, chest pain, chills, diaphoresis, fever, rash, swollen glands, urinary symptoms or vertigo.     Review of Systems   Constitutional:  Negative for activity change, appetite change, chills, diaphoresis, fever and unexpected weight change.   HENT:  Negative for drooling, ear discharge, ear pain, facial swelling, nosebleeds, trouble swallowing, voice change and goiter.    Eyes:  Negative for photophobia, pain, discharge, redness and visual disturbance.   Respiratory:  Negative for apnea, choking, chest tightness, shortness of breath, wheezing and stridor.    Cardiovascular:  Negative for chest pain, palpitations and leg swelling.   Gastrointestinal:  Negative for abdominal distention, anorexia, change in bowel habit, diarrhea, rectal pain and fecal incontinence.   Endocrine: Negative for cold intolerance, heat intolerance, polydipsia, polyphagia and polyuria.   Genitourinary:  Negative for bladder incontinence, dysuria, flank pain, frequency and hot flashes.   Musculoskeletal:  Positive for arthralgias, back pain, leg pain and neck pain.   Integumentary:  Negative for color change, pallor and rash.   Allergic/Immunologic: Negative for immunocompromised state.   Neurological:  Negative for dizziness, vertigo, seizures, syncope, facial asymmetry, speech difficulty, light-headedness, memory loss and coordination difficulties.   Hematological:  Negative for adenopathy. Does not bruise/bleed easily.   Psychiatric/Behavioral:  Negative for agitation, behavioral problems, confusion, decreased concentration, dysphoric mood, hallucinations, self-injury and  suicidal ideas. The patient is not nervous/anxious and is not hyperactive.            Past Medical History:   Diagnosis Date    Anxiety     Depression     Hyperlipidemia     Hypertension     Hypothyroidism      Past Surgical History:   Procedure Laterality Date    INJECTION OF ANESTHETIC AGENT AROUND MEDIAL BRANCH NERVES INNERVATING CERVICAL FACET JOINT Bilateral 2/25/2025    Procedure: Block-nerve-medial branch-cervical C4-6;  Surgeon: Amina Brumfield MD;  Location: [facility];  Service: Pain Management;  Laterality: Bilateral;    TOTAL THYROIDECTOMY  01/2018         Social History     Socioeconomic History    Marital status: Single    Number of children: 2   Occupational History    Occupation: telemetry     Employer: [employer]   Tobacco Use    Smoking status: Never     Passive exposure: Past    Smokeless tobacco: Never   Substance and Sexual Activity    Alcohol use: Yes     Alcohol/week: 1.0 standard drink of alcohol     Types: 1 Glasses of wine per week     Comment: 1 drink every 3 months    Drug use: Never    Sexual activity: Yes     Partners: Female     Birth control/protection: None, Post-menopausal   Social History Narrative    Patient lives with her daughters.     Social Drivers of Health     Financial Resource Strain: Low Risk  (12/5/2023)    Overall Financial Resource Strain (CARDIA)     Difficulty of Paying Living Expenses: Not very hard   Food Insecurity: Food Insecurity Present (12/5/2023)    Hunger Vital Sign     Worried About Running Out of Food in the Last Year: Sometimes true     Ran Out of Food in the Last Year: Never true   Transportation Needs: No Transportation Needs (12/5/2023)    PRAPARE - Transportation     Lack of Transportation (Medical): No     Lack of Transportation (Non-Medical): No   Physical Activity: Insufficiently Active (12/5/2023)    Exercise Vital Sign     Days of Exercise per Week: 3 days     Minutes of Exercise per Session: 20 min   Stress: Stress Concern  "Present (12/5/2023)    [employer] - Occupational Stress Questionnaire     Feeling of Stress : To some extent   Housing Stability: Low Risk  (12/5/2023)    Housing Stability Vital Sign     Unable to Pay for Housing in the Last Year: No     Number of Places Lived in the Last Year: 1     Unstable Housing in the Last Year: No     Family History   Problem Relation Name Age of Onset    Hypertension Mother Sparkle     Heart disease Mother Sparkle     Depression Mother Sparkle     Hypertension Father None     Diabetes Father None     Asthma Daughter Markell     Hypertension Maternal Grandfather A.C.     Stroke Maternal Grandfather A.C.      Review of patient's allergies indicates:   Allergen Reactions    Bactrim [sulfamethoxazole-trimethoprim] Hives    Ceclor [cefaclor] Hives        Objective:  Vitals:    03/18/25 1349 03/18/25 1350   BP: 119/80    Pulse: 87    Resp: 16    Weight: 112.9 kg (249 lb)    Height: 5' 1" (1.549 m)    PainSc:    4           Physical Exam  Vitals and nursing note reviewed. Exam conducted with a chaperone present.   Constitutional:       General: She is awake. She is not in acute distress.     Appearance: Normal appearance. She is not ill-appearing, toxic-appearing or diaphoretic.   HENT:      Head: Normocephalic and atraumatic.      Nose: Nose normal.      Mouth/Throat:      Mouth: Mucous membranes are moist.      Pharynx: Oropharynx is clear.   Eyes:      Conjunctiva/sclera: Conjunctivae normal.      Pupils: Pupils are equal, round, and reactive to light.   Cardiovascular:      Rate and Rhythm: Normal rate.   Pulmonary:      Effort: Pulmonary effort is normal. No respiratory distress.   Abdominal:      Palpations: Abdomen is soft.      Tenderness: There is no guarding.   Musculoskeletal:         General: Normal range of motion.      Cervical back: Normal range of motion and neck supple. No rigidity.   Skin:     General: Skin is warm and dry.      Coloration: Skin is not jaundiced " or pale.   Neurological:      General: No focal deficit present.      Mental Status: She is alert and oriented to person, place, and time. Mental status is at baseline.      Cranial Nerves: No cranial nerve deficit (II-XII).   Psychiatric:         Mood and Affect: Mood normal.         Behavior: Behavior normal. Behavior is cooperative.         Thought Content: Thought content normal.           [facility] for Pain Management  See OP Notes for results.     IMPRESSION: See OP Notes for results.     This procedure was auto-finalized by: Virtual Radiologist  Mammo Digital Diagnostic Right with Ye (XPD), US Breast Right Limited  Narrative: Facility:  [address] [address] [phone]    Name: Elif Romo    MRN: [id number]    Result:   Mammo Digital Diagnostic Right with Ye (XPD)  US Breast Right Limited     History:  Patient is 49 y.o. and is seen for abnormal screening mammogram.    Films Compared:  Compared to: 02/17/2025 Mammo Digital Screening Bilat w/ Ye     Findings:  This procedure was performed using tomosynthesis. Computer-aided detection   was utilized in the interpretation of this examination.    Diagnostic views of the right breast were performed, to include spot   compression and spot magnification views.    Real time ultrasound images are captured and archived.  Targeted   sonography of the right breast was performed in the region of interest.    Mammo Digital Diagnostic Right with Ye (XPD)  There are scattered areas of fibroglandular density.  There is a focal asymmetry seen in the inner and retroareolar regions of   the right breast in the anterior depth.   There are coarse calcifications in a grouped distribution seen in the   inner and retroareolar regions of the right breast in the anterior depth.    These are in the same region as the focal asymmetry.     US Breast Right Limited  There are no corresponding calcifications seen on this modality.      There is focal aymmetric density and coarse grouped microcalcification in   the medial right retroareolar region.  Biopsy is recommended.  No   ultrasound correlate.  Recommend wire localization and surgical excision.    Patient has surgical consultation scheduled with Dr Wilson on 3/4/2025 at   9:00  Impression: Right  Focal Asymmetry: Right breast focal asymmetry in the inner and   retroareolar regions in the anterior depth. Assessment: 4A - Suspicious   finding. Biopsy is recommended.     BI-RADS Category:   Overall: 4 - Suspicious       Recommendation:  Biopsy is recommended.    Your estimated lifetime risk of breast cancer (to age 85) based on   Tyrer-Cuzick risk assessment model is 7.35%.  According to the American   Cancer Society, patients with a lifetime breast cancer risk of 20% or   higher might benefit from supplemental screening tests, such as screening   breast MRI.    Electronically signed by,  Yan Hewitt MD       Office Visit on 02/11/2025   Component Date Value Ref Range Status    POC Amphetamines 02/11/2025 Negative  Negative, Inconclusive Final    POC Barbiturates 02/11/2025 Negative  Negative, Inconclusive Final    POC Benzodiazepines 02/11/2025 Negative  Negative, Inconclusive Final    POC Cocaine 02/11/2025 Negative  Negative, Inconclusive Final    POC THC 02/11/2025 Negative  Negative, Inconclusive Final    POC Methadone 02/11/2025 Negative  Negative, Inconclusive Final    POC Methamphetamine 02/11/2025 Negative  Negative, Inconclusive Final    POC Opiates 02/11/2025 Presumptive Positive (A)  Negative, Inconclusive Final    POC Oxycodone 02/11/2025 Negative  Negative, Inconclusive Final    POC Phencyclidine 02/11/2025 Negative  Negative, Inconclusive Final    POC Methylenedioxymethamphetamine * 02/11/2025 Negative  Negative, Inconclusive Final    POC Tricyclic Antidepressants 02/11/2025 Negative  Negative, Inconclusive Final    POC Buprenorphine 02/11/2025 Negative   Final      Acceptable 02/11/2025 Yes   Final    POC Temperature (Urine) 02/11/2025 92   Final    Creatinine, U 02/11/2025 46.6  mg/dL Final    Specific Gravity 02/11/2025 1.008   Final    pH 02/11/2025 7.0   Final    Oxidants 02/11/2025 Negative  Cutoff: 200 mg/L Final    Comment 02/11/2025 Normal   Final    Codeine 02/11/2025 Not Detected  Cutoff: 25 ng/mL Final    C6BG 02/11/2025 Not Detected  Cutoff: 100 ng/mL Final    Morphine 02/11/2025 Not Detected  Cutoff: 25 ng/mL Final    M6BG 02/11/2025 Not Detected  Cutoff: 100 ng/mL Final    6 Monoacetylmorphine 02/11/2025 Not Detected  Cutoff: 25 ng/mL Final    Hydrocodone 02/11/2025 Present (A)  Cutoff: 25 ng/mL Final    Norhydrocodone 02/11/2025 Present (A)  Cutoff: 25 ng/mL Final    Dihydrocodeine 02/11/2025 Present (A)  Cutoff: 25 ng/mL Final    Hydromorphone 02/11/2025 Not Detected  Cutoff: 25 ng/mL Final    Hydromorphone-3-beta-glucuronide 02/11/2025 Not Detected  Cutoff: 100 ng/mL Final    Oxycodone 02/11/2025 Not Detected  Cutoff: 25 ng/mL Final    Noroxycodone 02/11/2025 Not Detected  Cutoff: 25 ng/mL Final    Oxymorphone 02/11/2025 Not Detected  Cutoff: 25 ng/mL Final    Oxymorphone-3-beta-glucuronid 02/11/2025 Not Detected  Cutoff: 100 ng/mL Final    Noroxymorphone 02/11/2025 Not Detected  Cutoff: 25 ng/mL Final    Fentanyl 02/11/2025 Not Detected  Cutoff: 2 ng/mL Final    Norfentanyl 02/11/2025 Not Detected  Cutoff: 2 ng/mL Final    Meperidine 02/11/2025 Not Detected  Cutoff: 25 ng/mL Final    Normeperidine 02/11/2025 Not Detected  Cutoff: 25 ng/mL Final    Naloxone 02/11/2025 Not Detected  Cutoff: 25 ng/mL Final    Naloxone-3-beta-glucuronide 02/11/2025 Not Detected  Cutoff: 100 ng/mL Final    Methadone 02/11/2025 Not Detected  Cutoff: 25 ng/mL Final    EDDP 02/11/2025 Not Detected  Cutoff: 25 ng/mL Final    Propoxyphene 02/11/2025 Not Detected  Cutoff: 25 ng/mL Final    Norpropoxyphene 02/11/2025 Not Detected  Cutoff: 25 ng/mL Final    Tramadol  02/11/2025 Not Detected  Cutoff: 25 ng/mL Final    O-desmethyltramadol 02/11/2025 Not Detected  Cutoff: 25 ng/mL Final    Tapentadol 02/11/2025 Not Detected  Cutoff: 25 ng/mL Final    N-Desmethyltapentadol 02/11/2025 Not Detected  Cutoff: 50 ng/mL Final    M TAPENTADOL-BETA-GLUCURONIDE 02/11/2025 Not Detected  Cutoff: 100 ng/mL Final    Buprenorphine 02/11/2025 Not Detected  Cutoff: 5 ng/mL Final    Norbuprenorphine 02/11/2025 Not Detected  Cutoff: 5 ng/mL Final    Norbuprenorphine glucuronide 02/11/2025 Not Detected  Cutoff: 20 ng/mL Final    Opioid Interpretation 02/11/2025 SEE COMMENTS   Final    Cocaine 02/11/2025 Negative  Cutoff: 150 ng/mL Final    Tetrahydrocannabinol 02/11/2025 Negative  Cutoff: 50 ng/mL Final    Alprazolam 02/11/2025 Not Detected  Cutoff: 10 ng/mL Final    Alpha-Hydroxyalprazolam 02/11/2025 Not Detected  Cutoff: 10 ng/mL Final    Alpha-Hydroxyalprazolam Glucuronide 02/11/2025 Not Detected  Cutoff: 50 ng/mL Final    Chlordiazepoxide 02/11/2025 Not Detected  Cutoff: 10 ng/mL Final    Clobazam 02/11/2025 Not Detected  Cutoff: 10 ng/mL Final    N-Desmethylclobazam 02/11/2025 Not Detected  Cutoff: 200 ng/mL Final    Clonazepam 02/11/2025 Not Detected  Cutoff: 10 ng/mL Final    7-aminoclonazepam 02/11/2025 Not Detected  Cutoff: 10 ng/mL Final    Diazepam 02/11/2025 Not Detected  Cutoff: 10 ng/mL Final    Nordiazepam 02/11/2025 Not Detected  Cutoff: 10 ng/mL Final    Flunitrazepam 02/11/2025 Not Detected  Cutoff: 10 ng/mL Final    7-aminoflunitrazepam 02/11/2025 Not Detected  Cutoff: 10 ng/mL Final    Flurazepam 02/11/2025 Not Detected  Cutoff: 10 ng/mL Final    2-Hydroxy Ethyl Flurazepam 02/11/2025 Not Detected  Cutoff: 10 ng/mL Final    Lorazepam 02/11/2025 Not Detected  Cutoff: 10 ng/mL Final    Lorazepam Glucuronide 02/11/2025 Not Detected  Cutoff: 50 ng/mL Final    Midazolam 02/11/2025 Not Detected  Cutoff: 10 ng/mL Final    Alpha-Hydroxy Midazolam 02/11/2025 Not Detected  Cutoff: 10 ng/mL  Final    Oxazepam 02/11/2025 Not Detected  Cutoff: 10 ng/mL Final    Oxazepam Glucuronide 02/11/2025 Not Detected  Cutoff: 50 ng/mL Final    Prazepam 02/11/2025 Not Detected  Cutoff: 10 ng/mL Final    Temazepam 02/11/2025 Not Detected  Cutoff: 10 ng/mL Final    Temazepam Glucuronide 02/11/2025 Not Detected  Cutoff: 50 ng/mL Final    Triazolam 02/11/2025 Not Detected  Cutoff: 10 ng/mL Final    Alpha-Hydroxy Triazolam 02/11/2025 Not Detected  Cutoff: 10 ng/mL Final    Zolpidem 02/11/2025 Not Detected  Cutoff: 10 ng/mL Final    Zolpidem Phenyl-4-Carboxylic acid 02/11/2025 Not Detected  Cutoff: 10 ng/mL Final    Benzodiazepine Interpretation 02/11/2025 SEE COMMENTS   Final    List Patient's Current Medications 02/11/2025 Not Provided   Final    Methamphetamine 02/11/2025 Not Detected  Cutoff: 100 ng/mL Final    Amphetamine 02/11/2025 Not Detected  Cutoff: 100 ng/mL Final    3,4-methylenedioxymethamphetamine * 02/11/2025 Not Detected  Cutoff: 100 ng/mL Final    3,2-nceyxhjawsihdh-P-ethylamphetam* 02/11/2025 Not Detected  Cutoff: 100 ng/mL Final    3,4-methylenedioxyamphetamine (MDA) 02/11/2025 Not Detected  Cutoff: 100 ng/mL Final    Ephedrine 02/11/2025 Not Detected  Cutoff: 100 ng/mL Final    Pseudoephedrine 02/11/2025 Not Detected  Cutoff: 100 ng/mL Final    Phentermine 02/11/2025 Not Detected  Cutoff: 100 ng/mL Final    Phencyclidine (PCP) 02/11/2025 Not Detected  Cutoff: 20 ng/mL Final    Methylphenidate 02/11/2025 Not Detected  Cutoff: 20 ng/mL Final    Ritalinic acid 02/11/2025 Not Detected  Cutoff: 100 ng/mL Final    Stimulant Interpretation 02/11/2025 SEE COMMENTS   Final    Barbiturates 02/11/2025 Negative  Cutoff: 200 ng/mL Final   Office Visit on 02/10/2025   Component Date Value Ref Range Status    Creatinine, Urine 02/10/2025 114  15 - 325 mg/dL Final    Microalbumin 02/10/2025 158.5 (H)  <=3.0 mg/dL Final    Microalbumin/Creatinine Ratio 02/10/2025 1,390.4 (H)  0.0 - 30.0 mg/g Final    Glucose, Fasting  02/10/2025 84  70 - 100 mg/dL Final    Triglycerides 02/10/2025 203 (H)  37 - 140 mg/dL Final    Cholesterol 02/10/2025 231 (H)  <=200 mg/dL Final    HDL Cholesterol 02/10/2025 65 (H)  35 - 60 mg/dL Final    Cholesterol/HDL Ratio (Risk Factor) 02/10/2025 3.6   Final    Non-HDL 02/10/2025 166  mg/dL Final    LDL Calculated 02/10/2025 125  mg/dL Final    LDL/HDL 02/10/2025 1.9   Final    VLDL 02/10/2025 41  mg/dL Final    TSH 02/10/2025 24.257 (H)  0.350 - 4.940 uIU/mL Final   Office Visit on 10/14/2024   Component Date Value Ref Range Status    POC Amphetamines 10/14/2024 Negative  Negative, Inconclusive Final    POC Barbiturates 10/14/2024 Negative  Negative, Inconclusive Final    POC Benzodiazepines 10/14/2024 Negative  Negative, Inconclusive Final    POC Cocaine 10/14/2024 Negative  Negative, Inconclusive Final    POC THC 10/14/2024 Negative  Negative, Inconclusive Final    POC Methadone 10/14/2024 Negative  Negative, Inconclusive Final    POC Methamphetamine 10/14/2024 Negative  Negative, Inconclusive Final    POC Opiates 10/14/2024 Negative  Negative, Inconclusive Final    POC Oxycodone 10/14/2024 Negative  Negative, Inconclusive Final    POC Phencyclidine 10/14/2024 Negative  Negative, Inconclusive Final    POC Methylenedioxymethamphetamine * 10/14/2024 Negative  Negative, Inconclusive Final    POC Tricyclic Antidepressants 10/14/2024 Negative  Negative, Inconclusive Final    POC Buprenorphine 10/14/2024 Negative   Final     Acceptable 10/14/2024 Yes   Final    POC Temperature (Urine) 10/14/2024 92   Final    Creatinine, U 10/14/2024 144.8  mg/dL Final    Specific Gravity 10/14/2024 1.015   Final    pH 10/14/2024 5.5   Final    Oxidants 10/14/2024 Negative  Cutoff: 200 mg/L Final    Comment 10/14/2024 Normal   Final    Codeine 10/14/2024 Not Detected  Cutoff: 25 ng/mL Final    C6BG 10/14/2024 Not Detected  Cutoff: 100 ng/mL Final    Morphine 10/14/2024 Not Detected  Cutoff: 25 ng/mL Final    M6BG  10/14/2024 Not Detected  Cutoff: 100 ng/mL Final    6 Monoacetylmorphine 10/14/2024 Not Detected  Cutoff: 25 ng/mL Final    Hydrocodone 10/14/2024 Present (A)  Cutoff: 25 ng/mL Final    Norhydrocodone 10/14/2024 Present (A)  Cutoff: 25 ng/mL Final    Dihydrocodeine 10/14/2024 Not Detected  Cutoff: 25 ng/mL Final    Hydromorphone 10/14/2024 Not Detected  Cutoff: 25 ng/mL Final    Hydromorphone-3-beta-glucuronide 10/14/2024 Not Detected  Cutoff: 100 ng/mL Final    Oxycodone 10/14/2024 Not Detected  Cutoff: 25 ng/mL Final    Noroxycodone 10/14/2024 Not Detected  Cutoff: 25 ng/mL Final    Oxymorphone 10/14/2024 Not Detected  Cutoff: 25 ng/mL Final    Oxymorphone-3-beta-glucuronid 10/14/2024 Not Detected  Cutoff: 100 ng/mL Final    Noroxymorphone 10/14/2024 Not Detected  Cutoff: 25 ng/mL Final    Fentanyl 10/14/2024 Not Detected  Cutoff: 2 ng/mL Final    Norfentanyl 10/14/2024 Not Detected  Cutoff: 2 ng/mL Final    Meperidine 10/14/2024 Not Detected  Cutoff: 25 ng/mL Final    Normeperidine 10/14/2024 Not Detected  Cutoff: 25 ng/mL Final    Naloxone 10/14/2024 Not Detected  Cutoff: 25 ng/mL Final    Naloxone-3-beta-glucuronide 10/14/2024 Not Detected  Cutoff: 100 ng/mL Final    Methadone 10/14/2024 Not Detected  Cutoff: 25 ng/mL Final    EDDP 10/14/2024 Not Detected  Cutoff: 25 ng/mL Final    Propoxyphene 10/14/2024 Not Detected  Cutoff: 25 ng/mL Final    Norpropoxyphene 10/14/2024 Not Detected  Cutoff: 25 ng/mL Final    Tramadol 10/14/2024 Not Detected  Cutoff: 25 ng/mL Final    O-desmethyltramadol 10/14/2024 Not Detected  Cutoff: 25 ng/mL Final    Tapentadol 10/14/2024 Not Detected  Cutoff: 25 ng/mL Final    N-Desmethyltapentadol 10/14/2024 Not Detected  Cutoff: 50 ng/mL Final    M TAPENTADOL-BETA-GLUCURONIDE 10/14/2024 Not Detected  Cutoff: 100 ng/mL Final    Buprenorphine 10/14/2024 Not Detected  Cutoff: 5 ng/mL Final    Norbuprenorphine 10/14/2024 Not Detected  Cutoff: 5 ng/mL Final    Norbuprenorphine glucuronide  10/14/2024 Not Detected  Cutoff: 20 ng/mL Final    Opioid Interpretation 10/14/2024 SEE COMMENTS   Final    Cocaine 10/14/2024 Negative  Cutoff: 150 ng/mL Final    Tetrahydrocannabinol 10/14/2024 Negative  Cutoff: 50 ng/mL Final    Alprazolam 10/14/2024 Not Detected  Cutoff: 10 ng/mL Final    Alpha-Hydroxyalprazolam 10/14/2024 Not Detected  Cutoff: 10 ng/mL Final    Alpha-Hydroxyalprazolam Glucuronide 10/14/2024 Not Detected  Cutoff: 50 ng/mL Final    Chlordiazepoxide 10/14/2024 Not Detected  Cutoff: 10 ng/mL Final    Clobazam 10/14/2024 Not Detected  Cutoff: 10 ng/mL Final    N-Desmethylclobazam 10/14/2024 Not Detected  Cutoff: 200 ng/mL Final    Clonazepam 10/14/2024 Not Detected  Cutoff: 10 ng/mL Final    7-aminoclonazepam 10/14/2024 Not Detected  Cutoff: 10 ng/mL Final    Diazepam 10/14/2024 Not Detected  Cutoff: 10 ng/mL Final    Nordiazepam 10/14/2024 Not Detected  Cutoff: 10 ng/mL Final    Flunitrazepam 10/14/2024 Not Detected  Cutoff: 10 ng/mL Final    7-aminoflunitrazepam 10/14/2024 Not Detected  Cutoff: 10 ng/mL Final    Flurazepam 10/14/2024 Not Detected  Cutoff: 10 ng/mL Final    2-Hydroxy Ethyl Flurazepam 10/14/2024 Not Detected  Cutoff: 10 ng/mL Final    Lorazepam 10/14/2024 Not Detected  Cutoff: 10 ng/mL Final    Lorazepam Glucuronide 10/14/2024 Not Detected  Cutoff: 50 ng/mL Final    Midazolam 10/14/2024 Not Detected  Cutoff: 10 ng/mL Final    Alpha-Hydroxy Midazolam 10/14/2024 Not Detected  Cutoff: 10 ng/mL Final    Oxazepam 10/14/2024 Not Detected  Cutoff: 10 ng/mL Final    Oxazepam Glucuronide 10/14/2024 Not Detected  Cutoff: 50 ng/mL Final    Prazepam 10/14/2024 Not Detected  Cutoff: 10 ng/mL Final    Temazepam 10/14/2024 Not Detected  Cutoff: 10 ng/mL Final    Temazepam Glucuronide 10/14/2024 Not Detected  Cutoff: 50 ng/mL Final    Triazolam 10/14/2024 Not Detected  Cutoff: 10 ng/mL Final    Alpha-Hydroxy Triazolam 10/14/2024 Not Detected  Cutoff: 10 ng/mL Final    Zolpidem 10/14/2024 Not  Detected  Cutoff: 10 ng/mL Final    Zolpidem Phenyl-4-Carboxylic acid 10/14/2024 Not Detected  Cutoff: 10 ng/mL Final    Benzodiazepine Interpretation 10/14/2024 SEE COMMENTS   Final    List Patient's Current Medications 10/14/2024 NA   Final    Methamphetamine 10/14/2024 Not Detected  Cutoff: 100 ng/mL Final    Amphetamine 10/14/2024 Not Detected  Cutoff: 100 ng/mL Final    3,4-methylenedioxymethamphetamine * 10/14/2024 Not Detected  Cutoff: 100 ng/mL Final    3,8-fyawmgwzjzmcog-C-ethylamphetam* 10/14/2024 Not Detected  Cutoff: 100 ng/mL Final    3,4-methylenedioxyamphetamine (MDA) 10/14/2024 Not Detected  Cutoff: 100 ng/mL Final    Ephedrine 10/14/2024 Not Detected  Cutoff: 100 ng/mL Final    Pseudoephedrine 10/14/2024 Not Detected  Cutoff: 100 ng/mL Final    Phentermine 10/14/2024 Not Detected  Cutoff: 100 ng/mL Final    Phencyclidine (PCP) 10/14/2024 Not Detected  Cutoff: 20 ng/mL Final    Methylphenidate 10/14/2024 Not Detected  Cutoff: 20 ng/mL Final    Ritalinic acid 10/14/2024 Not Detected  Cutoff: 100 ng/mL Final    Stimulant Interpretation 10/14/2024 SEE COMMENTS   Final    Barbiturates 10/14/2024 Negative  Cutoff: 200 ng/mL Final         No orders of the defined types were placed in this encounter.      Requested Prescriptions     Signed Prescriptions Disp Refills    traMADoL (ULTRAM) 50 mg tablet 60 tablet 2     Sig: Take 1 tablet (50 mg total) by mouth every 12 (twelve) hours as needed for Pain.    methocarbamoL (ROBAXIN) 500 MG Tab 90 tablet 0     Sig: Take 1 tablet (500 mg total) by mouth 3 (three) times daily as needed (Muscle spasm).       Assessment:     1. 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 Primary osteoarthritis involving multiple joints    3. Chronic bilateral low back pain without sciatica           A's of Opioid Risk Assessment  Activity:  Current medication helps perform ADL.   Analgesia:Patients pain is partially controlled by current medication. Patient has tried OTC  "medications such as Tylenol and Ibuprofen with out relief.   Adverse Effects: Patient denies constipation or sedation.  Aberrant Behavior:  reviewed with no aberrant drug seeking/taking behavior.  Overdose reversal drug naloxone discussed     Drug screen reviewed        CT lumbar spine [facility] July 15, 2024    FINDINGS:  No fracture is seen.  Alignment of the spine is within normal limits.  Vertebral body heights are normal.  No other abnormality is demonstrated.     Impression:     No evidence of abnormality demonstrated       Plan:    February 17, 2025 definitive drug screen returned  Positive for hydrocodone metabolite   no tramadol        Narcan February 2025    No NSAIDs, stage 4 kidney disease      Presumptive drug screen negative for tramadol, this is expected result, patient takes tramadol, cup does not test for tramadol      BC BS [facility] denied cervical medial branch block December 10, 2024 "steroid versus local anesthetic usage"         Follow-up after cervical C4 through 6 medial branch block # 1, February 25, 2025  She states she had 80% relief after procedure  Procedure did help improve her level of function    Procedure note/fluoro images reviewed    She states she is doing well after procedure     She would like to continue current medication    CT of the cervical spine revealed degenerative changes and facet arthropathy from C4-C7.     Continue home exercise program as directed    Follow-up 3 months    Dr. Brumfield February 2026    Bring original prescription medication bottles/container/box with labels to each visit           "

## 2025-03-18 ENCOUNTER — OFFICE VISIT (OUTPATIENT)
Dept: PAIN MEDICINE | Facility: CLINIC | Age: 50
End: 2025-03-18
Payer: COMMERCIAL

## 2025-03-18 VITALS
HEIGHT: 61 IN | WEIGHT: 249 LBS | RESPIRATION RATE: 16 BRPM | BODY MASS INDEX: 47.01 KG/M2 | DIASTOLIC BLOOD PRESSURE: 80 MMHG | SYSTOLIC BLOOD PRESSURE: 119 MMHG | HEART RATE: 87 BPM

## 2025-03-18 DIAGNOSIS — M47.812 SPONDYLOSIS OF CERVICAL JOINT WITHOUT MYELOPATHY: Primary | Chronic | ICD-10-CM

## 2025-03-18 DIAGNOSIS — M54.50 CHRONIC BILATERAL LOW BACK PAIN WITHOUT SCIATICA: Chronic | ICD-10-CM

## 2025-03-18 DIAGNOSIS — G89.29 CHRONIC BILATERAL LOW BACK PAIN WITHOUT SCIATICA: Chronic | ICD-10-CM

## 2025-03-18 DIAGNOSIS — M15.0 PRIMARY OSTEOARTHRITIS INVOLVING MULTIPLE JOINTS: Chronic | ICD-10-CM

## 2025-03-18 PROCEDURE — 99999 PR PBB SHADOW E&M-EST. PATIENT-LVL IV: CPT | Mod: PBBFAC,,, | Performed by: PHYSICIAN ASSISTANT

## 2025-03-18 PROCEDURE — 1159F MED LIST DOCD IN RCRD: CPT | Mod: ,,, | Performed by: PHYSICIAN ASSISTANT

## 2025-03-18 PROCEDURE — 3074F SYST BP LT 130 MM HG: CPT | Mod: ,,, | Performed by: PHYSICIAN ASSISTANT

## 2025-03-18 PROCEDURE — 99214 OFFICE O/P EST MOD 30 MIN: CPT | Mod: PBBFAC | Performed by: PHYSICIAN ASSISTANT

## 2025-03-18 PROCEDURE — 3066F NEPHROPATHY DOC TX: CPT | Mod: ,,, | Performed by: PHYSICIAN ASSISTANT

## 2025-03-18 PROCEDURE — 99214 OFFICE O/P EST MOD 30 MIN: CPT | Mod: S$PBB,,, | Performed by: PHYSICIAN ASSISTANT

## 2025-03-18 PROCEDURE — 3008F BODY MASS INDEX DOCD: CPT | Mod: ,,, | Performed by: PHYSICIAN ASSISTANT

## 2025-03-18 PROCEDURE — 3079F DIAST BP 80-89 MM HG: CPT | Mod: ,,, | Performed by: PHYSICIAN ASSISTANT

## 2025-03-18 PROCEDURE — 3062F POS MACROALBUMINURIA REV: CPT | Mod: ,,, | Performed by: PHYSICIAN ASSISTANT

## 2025-03-18 RX ORDER — TRAMADOL HYDROCHLORIDE 50 MG/1
50 TABLET ORAL EVERY 12 HOURS PRN
Qty: 60 TABLET | Refills: 2 | Status: SHIPPED | OUTPATIENT
Start: 2025-03-18

## 2025-03-18 RX ORDER — METHOCARBAMOL 500 MG/1
500 TABLET, FILM COATED ORAL 3 TIMES DAILY PRN
Qty: 90 TABLET | Refills: 0 | Status: SHIPPED | OUTPATIENT
Start: 2025-03-18

## 2025-04-08 ENCOUNTER — PATIENT OUTREACH (OUTPATIENT)
Dept: ADMINISTRATIVE | Facility: HOSPITAL | Age: 50
End: 2025-04-08
Payer: COMMERCIAL

## 2025-04-08 NOTE — PROGRESS NOTES
04/08/2025   --Chart accessed for: Care gaps  --Care Gaps addressed: pap smear  Care Everywhere updates requested and reviewed.  [facility] and [facility] reviewed.  Next appointment 8/12/2025 with [facility] Due   Topic Date Due    Hepatitis C Screening  Never done    Cervical Cancer Screening  Never done    TETANUS VACCINE  Never done    Colorectal Cancer Screening  Never done    COVID-19 Vaccine (3 - 2024-25 season) 09/01/2024    Shingles Vaccine (1 of 2) Never done    Pneumococcal Vaccines (Age 50+) (1 of 1 - PCV) Never done

## 2025-05-22 DIAGNOSIS — Z12.11 ENCOUNTER FOR SCREENING COLONOSCOPY: Primary | ICD-10-CM

## 2025-05-22 RX ORDER — POLYETHYLENE GLYCOL 3350, SODIUM SULFATE ANHYDROUS, SODIUM BICARBONATE, SODIUM CHLORIDE, POTASSIUM CHLORIDE 236; 22.74; 6.74; 5.86; 2.97 G/4L; G/4L; G/4L; G/4L; G/4L
4 POWDER, FOR SOLUTION ORAL ONCE
Qty: 4000 ML | Refills: 0 | Status: SHIPPED | OUTPATIENT
Start: 2025-05-22 | End: 2025-05-22

## 2025-06-04 NOTE — PROGRESS NOTES
Subjective:         Patient ID: Elif Romo is a 49 y.o. female.    Chief Complaint: Neck Pain, Shoulder Pain, and Hip Pain      Pain  This is a chronic problem. The current episode started more than 1 year ago. The problem occurs daily. The problem has been unchanged. Associated symptoms include arthralgias and neck pain. Pertinent negatives include no anorexia, change in bowel habit, chest pain, chills, diaphoresis, fever, rash, swollen glands, urinary symptoms or vertigo.     Review of Systems   Constitutional:  Negative for activity change, appetite change, chills, diaphoresis, fever and unexpected weight change.   HENT:  Negative for drooling, ear discharge, ear pain, facial swelling, nosebleeds, trouble swallowing, voice change and goiter.    Eyes:  Negative for photophobia, pain, discharge, redness and visual disturbance.   Respiratory:  Negative for apnea, choking, chest tightness, shortness of breath, wheezing and stridor.    Cardiovascular:  Negative for chest pain, palpitations and leg swelling.   Gastrointestinal:  Negative for abdominal distention, anorexia, change in bowel habit, diarrhea, rectal pain and fecal incontinence.   Endocrine: Negative for cold intolerance, heat intolerance, polydipsia, polyphagia and polyuria.   Genitourinary:  Negative for bladder incontinence, dysuria, flank pain, frequency and hot flashes.   Musculoskeletal:  Positive for arthralgias, back pain, leg pain and neck pain.   Integumentary:  Negative for color change, pallor and rash.   Allergic/Immunologic: Negative for immunocompromised state.   Neurological:  Negative for dizziness, vertigo, seizures, syncope, facial asymmetry, speech difficulty, light-headedness, coordination difficulties and memory loss.   Hematological:  Negative for adenopathy. Does not bruise/bleed easily.   Psychiatric/Behavioral:  Negative for agitation, behavioral problems, confusion, decreased concentration, dysphoric mood, hallucinations,  self-injury and suicidal ideas. The patient is not nervous/anxious and is not hyperactive.            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     Depression     General anesthetics causing adverse effect in therapeutic use     Hyperlipidemia     Hypertension     Hypothyroidism      Past Surgical History:   Procedure Laterality Date    INJECTION OF ANESTHETIC AGENT AROUND MEDIAL BRANCH NERVES INNERVATING CERVICAL FACET JOINT Bilateral 2/25/2025    Procedure: Block-nerve-medial branch-cervical C4-6;  Surgeon: Amina Brumfeild MD;  Location: [facility];  Service: Pain Management;  Laterality: Bilateral;    TOTAL THYROIDECTOMY  01/2018         Social History     Socioeconomic History    Marital status: Single    Number of children: 2   Occupational History    Occupation: telemetry     Employer: [employer]   Tobacco Use    Smoking status: Never     Passive exposure: Past    Smokeless tobacco: Never   Substance and Sexual Activity    Alcohol use: Yes     Alcohol/week: 1.0 standard drink of alcohol     Types: 1 Glasses of wine per week     Comment: 1 drink every 3 months    Drug use: Never    Sexual activity: Yes     Partners: Female     Birth control/protection: None, Post-menopausal   Social History Narrative    Patient lives with her daughters.     Social Drivers of Health     Financial Resource Strain: Low Risk  (12/5/2023)    Overall Financial Resource Strain (CARDIA)     Difficulty of Paying Living Expenses: Not very hard   Food Insecurity: Food Insecurity Present (12/5/2023)    Hunger Vital Sign     Worried About Running Out of Food in the Last Year: Sometimes true     Ran Out of Food in the Last Year: Never true   Transportation Needs: No Transportation Needs (12/5/2023)    PRAPARE - Transportation     Lack of Transportation (Medical): No     Lack of Transportation (Non-Medical): No   Physical Activity: Insufficiently Active  "(12/5/2023)    Exercise Vital Sign     Days of Exercise per Week: 3 days     Minutes of Exercise per Session: 20 min   Stress: Stress Concern Present (12/5/2023)    [employer] - Occupational Stress Questionnaire     Feeling of Stress : To some extent   Housing Stability: Low Risk  (12/5/2023)    Housing Stability Vital Sign     Unable to Pay for Housing in the Last Year: No     Number of Places Lived in the Last Year: 1     Unstable Housing in the Last Year: No     Family History   Problem Relation Name Age of Onset    Hypertension Mother Sparkle     Heart disease Mother Sparkle     Depression Mother Sparkle     Hypertension Father None     Diabetes Father None     Asthma Daughter Markell     Hypertension Maternal Grandfather A.C.     Stroke Maternal Grandfather A.C.      Review of patient's allergies indicates:   Allergen Reactions    Bactrim [sulfamethoxazole-trimethoprim] Hives    Ceclor [cefaclor] Hives        Objective:  Vitals:    06/16/25 0755 06/16/25 0756   BP: (!) 144/103    Pulse: 91    Resp: 18    Weight: 114.3 kg (252 lb)    Height: 5' 1" (1.549 m)    PainSc:   7   7             Physical Exam  Vitals and nursing note reviewed. Exam conducted with a chaperone present.   Constitutional:       General: She is awake. She is not in acute distress.     Appearance: Normal appearance. She is not ill-appearing, toxic-appearing or diaphoretic.   HENT:      Head: Normocephalic and atraumatic.      Nose: Nose normal.      Mouth/Throat:      Mouth: Mucous membranes are moist.      Pharynx: Oropharynx is clear.   Eyes:      Conjunctiva/sclera: Conjunctivae normal.      Pupils: Pupils are equal, round, and reactive to light.   Cardiovascular:      Rate and Rhythm: Normal rate.   Pulmonary:      Effort: Pulmonary effort is normal. No respiratory distress.   Abdominal:      Palpations: Abdomen is soft.      Tenderness: There is no guarding.   Musculoskeletal:         General: Normal range of motion.      " Cervical back: Normal range of motion and neck supple. Tenderness present. No rigidity.      Lumbar back: Tenderness present.   Skin:     General: Skin is warm and dry.      Coloration: Skin is not jaundiced or pale.   Neurological:      General: No focal deficit present.      Mental Status: She is alert and oriented to person, place, and time. Mental status is at baseline.      Cranial Nerves: No cranial nerve deficit (II-XII).   Psychiatric:         Mood and Affect: Mood normal.         Behavior: Behavior normal. Behavior is cooperative.         Thought Content: Thought content normal.           Colonoscopy  Narrative: Table formatting from the original result was not included.  Procedure Date  6/5/25    Impression  Overall   Impression:    Skin tag  5 polyps; performed cold snare  Hemorrhoids    Recommendation  - Repeat colonoscopy in 3 years  - Discharge patient to home  - Advance diet as tolerated  - Continue present medications  - Await pathology results  - Patient has a contact number available for emergencies. The signs and   symptoms of potential delayed complications were discussed with the   patient. Return to normal activities tomorrow. Written discharge   instructions were provided to the patient.    Indication  Screening for malignant neoplasm of colon    Post Procedure Diagnosis  Adenoma of colon    Staff present during procedure  Alvarez Palm RN Registered Nurse   Sonia Desouza ST Technician   Jason Ward II, CRNA Evelyn Tejada, Edvin Petersen CRNA, MD Proceduralist     Medications  General anesthesia - See anesthesia record.    Preprocedure  A history and physical has been performed, and patient medication   allergies have been reviewed. The patient's tolerance of previous   anesthesia has been reviewed. The risks and benefits of the procedure and   the sedation options and risks were discussed with the patient. All   questions were answered and informed  consent obtained.    Details of the Procedure  The patient underwent general anesthesia, which was administered by an   anesthesia professional. The patient's heart rate, blood pressure, level   of consciousness, respirations, oxygen, ECG and ETCO2 were monitored   throughout the procedure. A digital rectal exam was performed. A perianal   exam was performed. The scope was introduced through the anus and advanced   to the cecum. Retroflexion was not performed due to narrow vault. The   quality of bowel preparation was evaluated using the [place] Bowel   Preparation Scale with scores of: right colon = 3, transverse colon = 3,   left colon = 3. The total BBPS score was 9. Bowel prep was adequate. The   patient's estimated blood loss was minimal (<5 mL). The procedure was not   difficult. The patient tolerated the procedure well. There were no   apparent adverse events.     Scope: Colonoscope  Scope Serial: [id number]    Events    Procedure Events   Event Event Time     Procedure Events   Event Event Time   ENDO SCOPE IN TIME 6/5/2025 12:46 PM   ENDO CECUM REACHED 6/5/2025 12:53 PM   ENDO SCOPE OUT TIME 6/5/2025  1:10 PM     CECAL WITHDRAWAL TIME: 16m 38s    Findings  Skin tag observed during perianal exam  Three 5 mm polyps in the ascending colon; performed cold snare removal  Two polyps measuring 5-9 mm in the descending colon; performed cold snare   removal  Internal hemorrhoids observed during retroflexion       Hospital Outpatient Visit on 06/05/2025   Component Date Value Ref Range Status    POC HCG Urine 06/05/2025 Negative   Final    Case Report 06/05/2025    Final                    Value:Surgical Pathology                                Case: [id number]                                   Authorizing Provider:  Edvin Bhakta MD     Collected:           06/05/2025 12:57 PM          Ordering Location:     [facility] -         Received:            06/05/2025 02:10 PM                                  "Endoscopy                                                                    Pathologist:           Patrick Whitlock MD                                                           Specimens:   A) - Large intestine, Ascending Colon, a.  ascending polyp x3                                       B) - Large intestine, Descending Colon, b. descending polyp x2                             Final Diagnosis 06/05/2025    Final                    Value:A. Ascending colon polyps, polypectomy:  Tubular adenomas    B. Descending colon polyps, polypectomy:  Tubular adenomas      Gross Description 06/05/2025    Final                    Value:A. Large intestine, Ascending Colon: a.  ascending polyp x3  The specimens are received in formalin designated "ascending polyp x3" and consist of 5 white-tan tissue fragments (0.2, 0.5, and 0.6 cm) entirely submitted in cassette A1.    Grossing was completed by Federico Huerta.  B. Large intestine, Descending Colon: b. descending polyp x2  The specimens are received in formalin designated "descending polyp x2" and consist of a 0.2 cm tan polypoid tissue fragment and a 0.2 cm irregular white-tan tissue fragment.  Both tissue fragments are entirely submitted in cassette B1.    Grossing was completed by Federico Huerta.      Microscopic Description 06/05/2025    Final                    Value:A microscopic examination was performed and the diagnosis reflects the findings.          Laboratory Notes 06/05/2025    Final                    Value:If this report includes immunohistochemical (IHC) test results, please note the following: IHC studies were interpreted in conjunction with appropriate positive and negative controls which demonstrate the expected positive and negative reactivity. This laboratory is regulated under CLIA as qualified to perform high-complexity testing. IHC tests are used for clinical purposes. They should not be regarded as investigational or research.       Office Visit on " 02/11/2025   Component Date Value Ref Range Status    POC Amphetamines 02/11/2025 Negative  Negative, Inconclusive Final    POC Barbiturates 02/11/2025 Negative  Negative, Inconclusive Final    POC Benzodiazepines 02/11/2025 Negative  Negative, Inconclusive Final    POC Cocaine 02/11/2025 Negative  Negative, Inconclusive Final    POC THC 02/11/2025 Negative  Negative, Inconclusive Final    POC Methadone 02/11/2025 Negative  Negative, Inconclusive Final    POC Methamphetamine 02/11/2025 Negative  Negative, Inconclusive Final    POC Opiates 02/11/2025 Presumptive Positive (A)  Negative, Inconclusive Final    POC Oxycodone 02/11/2025 Negative  Negative, Inconclusive Final    POC Phencyclidine 02/11/2025 Negative  Negative, Inconclusive Final    POC Methylenedioxymethamphetamine * 02/11/2025 Negative  Negative, Inconclusive Final    POC Tricyclic Antidepressants 02/11/2025 Negative  Negative, Inconclusive Final    POC Buprenorphine 02/11/2025 Negative   Final     Acceptable 02/11/2025 Yes   Final    POC Temperature (Urine) 02/11/2025 92   Final    Creatinine, U 02/11/2025 46.6  mg/dL Final    Specific Gravity 02/11/2025 1.008   Final    pH 02/11/2025 7.0   Final    Oxidants 02/11/2025 Negative  Cutoff: 200 mg/L Final    Comment 02/11/2025 Normal   Final    Codeine 02/11/2025 Not Detected  Cutoff: 25 ng/mL Final    C6BG 02/11/2025 Not Detected  Cutoff: 100 ng/mL Final    Morphine 02/11/2025 Not Detected  Cutoff: 25 ng/mL Final    M6BG 02/11/2025 Not Detected  Cutoff: 100 ng/mL Final    6 Monoacetylmorphine 02/11/2025 Not Detected  Cutoff: 25 ng/mL Final    Hydrocodone 02/11/2025 Present (A)  Cutoff: 25 ng/mL Final    Norhydrocodone 02/11/2025 Present (A)  Cutoff: 25 ng/mL Final    Dihydrocodeine 02/11/2025 Present (A)  Cutoff: 25 ng/mL Final    Hydromorphone 02/11/2025 Not Detected  Cutoff: 25 ng/mL Final    Hydromorphone-3-beta-glucuronide 02/11/2025 Not Detected  Cutoff: 100 ng/mL Final    Oxycodone  02/11/2025 Not Detected  Cutoff: 25 ng/mL Final    Noroxycodone 02/11/2025 Not Detected  Cutoff: 25 ng/mL Final    Oxymorphone 02/11/2025 Not Detected  Cutoff: 25 ng/mL Final    Oxymorphone-3-beta-glucuronid 02/11/2025 Not Detected  Cutoff: 100 ng/mL Final    Noroxymorphone 02/11/2025 Not Detected  Cutoff: 25 ng/mL Final    Fentanyl 02/11/2025 Not Detected  Cutoff: 2 ng/mL Final    Norfentanyl 02/11/2025 Not Detected  Cutoff: 2 ng/mL Final    Meperidine 02/11/2025 Not Detected  Cutoff: 25 ng/mL Final    Normeperidine 02/11/2025 Not Detected  Cutoff: 25 ng/mL Final    Naloxone 02/11/2025 Not Detected  Cutoff: 25 ng/mL Final    Naloxone-3-beta-glucuronide 02/11/2025 Not Detected  Cutoff: 100 ng/mL Final    Methadone 02/11/2025 Not Detected  Cutoff: 25 ng/mL Final    EDDP 02/11/2025 Not Detected  Cutoff: 25 ng/mL Final    Propoxyphene 02/11/2025 Not Detected  Cutoff: 25 ng/mL Final    Norpropoxyphene 02/11/2025 Not Detected  Cutoff: 25 ng/mL Final    Tramadol 02/11/2025 Not Detected  Cutoff: 25 ng/mL Final    O-desmethyltramadol 02/11/2025 Not Detected  Cutoff: 25 ng/mL Final    Tapentadol 02/11/2025 Not Detected  Cutoff: 25 ng/mL Final    N-Desmethyltapentadol 02/11/2025 Not Detected  Cutoff: 50 ng/mL Final    M TAPENTADOL-BETA-GLUCURONIDE 02/11/2025 Not Detected  Cutoff: 100 ng/mL Final    Buprenorphine 02/11/2025 Not Detected  Cutoff: 5 ng/mL Final    Norbuprenorphine 02/11/2025 Not Detected  Cutoff: 5 ng/mL Final    Norbuprenorphine glucuronide 02/11/2025 Not Detected  Cutoff: 20 ng/mL Final    Opioid Interpretation 02/11/2025 SEE COMMENTS   Final    Cocaine 02/11/2025 Negative  Cutoff: 150 ng/mL Final    Tetrahydrocannabinol 02/11/2025 Negative  Cutoff: 50 ng/mL Final    Alprazolam 02/11/2025 Not Detected  Cutoff: 10 ng/mL Final    Alpha-Hydroxyalprazolam 02/11/2025 Not Detected  Cutoff: 10 ng/mL Final    Alpha-Hydroxyalprazolam Glucuronide 02/11/2025 Not Detected  Cutoff: 50 ng/mL Final    Chlordiazepoxide  02/11/2025 Not Detected  Cutoff: 10 ng/mL Final    Clobazam 02/11/2025 Not Detected  Cutoff: 10 ng/mL Final    N-Desmethylclobazam 02/11/2025 Not Detected  Cutoff: 200 ng/mL Final    Clonazepam 02/11/2025 Not Detected  Cutoff: 10 ng/mL Final    7-aminoclonazepam 02/11/2025 Not Detected  Cutoff: 10 ng/mL Final    Diazepam 02/11/2025 Not Detected  Cutoff: 10 ng/mL Final    Nordiazepam 02/11/2025 Not Detected  Cutoff: 10 ng/mL Final    Flunitrazepam 02/11/2025 Not Detected  Cutoff: 10 ng/mL Final    7-aminoflunitrazepam 02/11/2025 Not Detected  Cutoff: 10 ng/mL Final    Flurazepam 02/11/2025 Not Detected  Cutoff: 10 ng/mL Final    2-Hydroxy Ethyl Flurazepam 02/11/2025 Not Detected  Cutoff: 10 ng/mL Final    Lorazepam 02/11/2025 Not Detected  Cutoff: 10 ng/mL Final    Lorazepam Glucuronide 02/11/2025 Not Detected  Cutoff: 50 ng/mL Final    Midazolam 02/11/2025 Not Detected  Cutoff: 10 ng/mL Final    Alpha-Hydroxy Midazolam 02/11/2025 Not Detected  Cutoff: 10 ng/mL Final    Oxazepam 02/11/2025 Not Detected  Cutoff: 10 ng/mL Final    Oxazepam Glucuronide 02/11/2025 Not Detected  Cutoff: 50 ng/mL Final    Prazepam 02/11/2025 Not Detected  Cutoff: 10 ng/mL Final    Temazepam 02/11/2025 Not Detected  Cutoff: 10 ng/mL Final    Temazepam Glucuronide 02/11/2025 Not Detected  Cutoff: 50 ng/mL Final    Triazolam 02/11/2025 Not Detected  Cutoff: 10 ng/mL Final    Alpha-Hydroxy Triazolam 02/11/2025 Not Detected  Cutoff: 10 ng/mL Final    Zolpidem 02/11/2025 Not Detected  Cutoff: 10 ng/mL Final    Zolpidem Phenyl-4-Carboxylic acid 02/11/2025 Not Detected  Cutoff: 10 ng/mL Final    Benzodiazepine Interpretation 02/11/2025 SEE COMMENTS   Final    List Patient's Current Medications 02/11/2025 Not Provided   Final    Methamphetamine 02/11/2025 Not Detected  Cutoff: 100 ng/mL Final    Amphetamine 02/11/2025 Not Detected  Cutoff: 100 ng/mL Final    3,4-methylenedioxymethamphetamine * 02/11/2025 Not Detected  Cutoff: 100 ng/mL Final     3,3-nohgmxoopmsdrg-K-ethylamphetam* 02/11/2025 Not Detected  Cutoff: 100 ng/mL Final    3,4-methylenedioxyamphetamine (MDA) 02/11/2025 Not Detected  Cutoff: 100 ng/mL Final    Ephedrine 02/11/2025 Not Detected  Cutoff: 100 ng/mL Final    Pseudoephedrine 02/11/2025 Not Detected  Cutoff: 100 ng/mL Final    Phentermine 02/11/2025 Not Detected  Cutoff: 100 ng/mL Final    Phencyclidine (PCP) 02/11/2025 Not Detected  Cutoff: 20 ng/mL Final    Methylphenidate 02/11/2025 Not Detected  Cutoff: 20 ng/mL Final    Ritalinic acid 02/11/2025 Not Detected  Cutoff: 100 ng/mL Final    Stimulant Interpretation 02/11/2025 SEE COMMENTS   Final    Barbiturates 02/11/2025 Negative  Cutoff: 200 ng/mL Final   Office Visit on 02/10/2025   Component Date Value Ref Range Status    Creatinine, Urine 02/10/2025 114  15 - 325 mg/dL Final    Microalbumin 02/10/2025 158.5 (H)  <=3.0 mg/dL Final    Microalbumin/Creatinine Ratio 02/10/2025 1,390.4 (H)  0.0 - 30.0 mg/g Final    Glucose, Fasting 02/10/2025 84  70 - 100 mg/dL Final    Triglycerides 02/10/2025 203 (H)  37 - 140 mg/dL Final    Cholesterol 02/10/2025 231 (H)  <=200 mg/dL Final    HDL Cholesterol 02/10/2025 65 (H)  35 - 60 mg/dL Final    Cholesterol/HDL Ratio (Risk Factor) 02/10/2025 3.6   Final    Non-HDL 02/10/2025 166  mg/dL Final    LDL Calculated 02/10/2025 125  mg/dL Final    LDL/HDL 02/10/2025 1.9   Final    VLDL 02/10/2025 41  mg/dL Final    TSH 02/10/2025 24.257 (H)  0.350 - 4.940 uIU/mL Final         Orders Placed This Encounter   Procedures    POCT Urine Drug Screen Presump     Interpretive Information:     Negative:  No drug detected at the cut off level.   Positive:  This result represents presumptive positive for the   tested drug, other substances may yield a positive response other   than the analyte of interest. This result should be utilized for   diagnostic purpose only. Confirmation testing will be performed upon physician request only.          Requested Prescriptions  "    Signed Prescriptions Disp Refills    methocarbamoL (ROBAXIN) 500 MG Tab 90 tablet 0     Sig: Take 1 tablet (500 mg total) by mouth 3 (three) times daily as needed (Muscle spasm).    traMADoL (ULTRAM) 50 mg tablet 60 tablet 2     Sig: Take 1 tablet (50 mg total) by mouth every 12 (twelve) hours as needed for Pain.       Assessment:     1. 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 Primary osteoarthritis involving multiple joints    3. Chronic bilateral low back pain without sciatica    4. Cervicalgia    5. Encounter for long-term (current) use of medications             A's of Opioid Risk Assessment  Activity:  Current medication helps perform ADL.   Analgesia:Patients pain is partially controlled by current medication. Patient has tried OTC medications such as Tylenol and Ibuprofen with out relief.   Adverse Effects: Patient denies constipation or sedation.  Aberrant Behavior:  reviewed with no aberrant drug seeking/taking behavior.  Overdose reversal drug naloxone discussed     Drug screen reviewed        CT lumbar spine [facility] July 15, 2024    FINDINGS:  No fracture is seen.  Alignment of the spine is within normal limits.  Vertebral body heights are normal.  No other abnormality is demonstrated.     Impression:     No evidence of abnormality demonstrated       Plan:    February 17, 2025 definitive drug screen returned  Positive for hydrocodone metabolite   no tramadol        Narcan February 2025    No NSAIDs, stage 4 kidney disease      Presumptive drug screen negative for tramadol, this is expected result, patient takes tramadol, cup does not test for tramadol      BC BS [facility] denied cervical medial branch block December 10, 2024 "steroid versus local anesthetic usage"         She had cervical C4 through 6 medial branch block # 1, February 25, 2025  She states she had 80% relief after procedure  Procedure did help improve her level of function      No new complaints today     She " states current medications helping control her discomfort     She would like to continue with current medication    Continue home exercise program as directed    Follow-up 3 months    Dr. Brumfield February 2026    Bring original prescription medication bottles/container/box with labels to each visit

## 2025-06-05 ENCOUNTER — HOSPITAL ENCOUNTER (OUTPATIENT)
Dept: GASTROENTEROLOGY | Facility: HOSPITAL | Age: 50
Discharge: HOME | End: 2025-06-05
Attending: NURSE PRACTITIONER | Admitting: INTERNAL MEDICINE
Payer: COMMERCIAL

## 2025-06-05 ENCOUNTER — ANESTHESIA EVENT (OUTPATIENT)
Dept: GASTROENTEROLOGY | Facility: HOSPITAL | Age: 50
End: 2025-06-05
Payer: COMMERCIAL

## 2025-06-05 ENCOUNTER — ANESTHESIA (OUTPATIENT)
Dept: GASTROENTEROLOGY | Facility: HOSPITAL | Age: 50
End: 2025-06-05
Payer: COMMERCIAL

## 2025-06-05 VITALS
OXYGEN SATURATION: 99 % | DIASTOLIC BLOOD PRESSURE: 82 MMHG | RESPIRATION RATE: 12 BRPM | SYSTOLIC BLOOD PRESSURE: 133 MMHG | BODY MASS INDEX: 47.2 KG/M2 | WEIGHT: 250 LBS | HEIGHT: 61 IN | HEART RATE: 82 BPM | TEMPERATURE: 98 F

## 2025-06-05 DIAGNOSIS — Z12.11 SCREENING FOR MALIGNANT NEOPLASM OF COLON: ICD-10-CM

## 2025-06-05 LAB — POC URINE HCG: NEGATIVE

## 2025-06-05 PROCEDURE — 63600175 PHARM REV CODE 636 W HCPCS: Performed by: NURSE ANESTHETIST, CERTIFIED REGISTERED

## 2025-06-05 PROCEDURE — 25000003 PHARM REV CODE 250: Performed by: NURSE ANESTHETIST, CERTIFIED REGISTERED

## 2025-06-05 PROCEDURE — 88305 TISSUE EXAM BY PATHOLOGIST: CPT | Mod: TC,91,SUR | Performed by: INTERNAL MEDICINE

## 2025-06-05 PROCEDURE — 27000284 HC CANNULA NASAL

## 2025-06-05 RX ORDER — SODIUM CHLORIDE, SODIUM LACTATE, POTASSIUM CHLORIDE, CALCIUM CHLORIDE 600; 310; 30; 20 MG/100ML; MG/100ML; MG/100ML; MG/100ML
INJECTION, SOLUTION INTRAVENOUS CONTINUOUS
Status: DISCONTINUED | OUTPATIENT
Start: 2025-06-05 | End: 2025-06-06 | Stop reason: HOSPADM

## 2025-06-05 RX ORDER — LIDOCAINE HYDROCHLORIDE 20 MG/ML
INJECTION, SOLUTION EPIDURAL; INFILTRATION; INTRACAUDAL; PERINEURAL
Status: DISCONTINUED | OUTPATIENT
Start: 2025-06-05 | End: 2025-06-05

## 2025-06-05 RX ORDER — SODIUM CHLORIDE 0.9 % (FLUSH) 0.9 %
10 SYRINGE (ML) INJECTION EVERY 6 HOURS PRN
Status: DISCONTINUED | OUTPATIENT
Start: 2025-06-05 | End: 2025-06-06 | Stop reason: HOSPADM

## 2025-06-05 RX ORDER — PROPOFOL 10 MG/ML
VIAL (ML) INTRAVENOUS
Status: DISCONTINUED | OUTPATIENT
Start: 2025-06-05 | End: 2025-06-05

## 2025-06-05 RX ADMIN — PROPOFOL 20 MG: 10 INJECTION, EMULSION INTRAVENOUS at 12:06

## 2025-06-05 RX ADMIN — PROPOFOL 50 MG: 10 INJECTION, EMULSION INTRAVENOUS at 12:06

## 2025-06-05 RX ADMIN — PROPOFOL 30 MG: 10 INJECTION, EMULSION INTRAVENOUS at 01:06

## 2025-06-05 RX ADMIN — PROPOFOL 20 MG: 10 INJECTION, EMULSION INTRAVENOUS at 01:06

## 2025-06-05 RX ADMIN — PROPOFOL 50 MG: 10 INJECTION, EMULSION INTRAVENOUS at 01:06

## 2025-06-05 RX ADMIN — LIDOCAINE HYDROCHLORIDE 50 MG: 20 INJECTION, SOLUTION EPIDURAL; INFILTRATION; INTRACAUDAL; PERINEURAL at 12:06

## 2025-06-05 RX ADMIN — SODIUM CHLORIDE: 9 INJECTION, SOLUTION INTRAVENOUS at 12:06

## 2025-06-06 LAB
ESTROGEN SERPL-MCNC: NORMAL PG/ML
INSULIN SERPL-ACNC: NORMAL U[IU]/ML
LAB AP GROSS DESCRIPTION: NORMAL
LAB AP LABORATORY NOTES: NORMAL
T3RU NFR SERPL: NORMAL %

## 2025-06-12 ENCOUNTER — RESULTS FOLLOW-UP (OUTPATIENT)
Dept: GASTROENTEROLOGY | Facility: HOSPITAL | Age: 50
End: 2025-06-12

## 2025-06-13 ENCOUNTER — TELEPHONE (OUTPATIENT)
Dept: GASTROENTEROLOGY | Facility: CLINIC | Age: 50
End: 2025-06-13
Payer: COMMERCIAL

## 2025-06-13 NOTE — TELEPHONE ENCOUNTER
----- Message from Edvin Bhakta MD sent at 6/12/2025  9:03 PM CDT -----  Please advise patient that polyp pathology was reviewed and is benign and is the adenomatous type which is a precancerous/risk factor for cancer. Repeat colonoscopy recommended in 3 years. Place   reminder in system for repeat colonoscopy.    ----- Message -----  From: D[place], Lab In [facility]  Sent: 6/5/2025  12:02 PM CDT  To: Edvin Bhakta MD

## 2025-06-13 NOTE — TELEPHONE ENCOUNTER
Spoke w/ pt - results and recommendation reviewed w/ good vu noted - 3 yr reminder to repeat colonoscopy placed in system

## 2025-06-16 ENCOUNTER — OFFICE VISIT (OUTPATIENT)
Dept: PAIN MEDICINE | Facility: CLINIC | Age: 50
End: 2025-06-16
Payer: COMMERCIAL

## 2025-06-16 VITALS
SYSTOLIC BLOOD PRESSURE: 144 MMHG | HEIGHT: 61 IN | RESPIRATION RATE: 18 BRPM | WEIGHT: 252 LBS | HEART RATE: 91 BPM | BODY MASS INDEX: 47.58 KG/M2 | DIASTOLIC BLOOD PRESSURE: 103 MMHG

## 2025-06-16 DIAGNOSIS — M54.2 CERVICALGIA: ICD-10-CM

## 2025-06-16 DIAGNOSIS — M47.812 SPONDYLOSIS OF CERVICAL JOINT WITHOUT MYELOPATHY: Primary | Chronic | ICD-10-CM

## 2025-06-16 DIAGNOSIS — G89.29 CHRONIC BILATERAL LOW BACK PAIN WITHOUT SCIATICA: Chronic | ICD-10-CM

## 2025-06-16 DIAGNOSIS — M15.0 PRIMARY OSTEOARTHRITIS INVOLVING MULTIPLE JOINTS: Chronic | ICD-10-CM

## 2025-06-16 DIAGNOSIS — M54.50 CHRONIC BILATERAL LOW BACK PAIN WITHOUT SCIATICA: Chronic | ICD-10-CM

## 2025-06-16 DIAGNOSIS — Z79.899 ENCOUNTER FOR LONG-TERM (CURRENT) USE OF MEDICATIONS: ICD-10-CM

## 2025-06-16 LAB
CTP QC/QA: YES
POC (AMP) AMPHETAMINE: NEGATIVE
POC (BAR) BARBITURATES: NEGATIVE
POC (BUP) BUPRENORPHINE: NEGATIVE
POC (BZO) BENZODIAZEPINES: NEGATIVE
POC (COC) COCAINE: NEGATIVE
POC (MDMA) METHYLENEDIOXYMETHAMPHETAMINE 3,4: NEGATIVE
POC (MET) METHAMPHETAMINE: NEGATIVE
POC (MOP) OPIATES: NEGATIVE
POC (MTD) METHADONE: NEGATIVE
POC (OXY) OXYCODONE: NEGATIVE
POC (PCP) PHENCYCLIDINE: NEGATIVE
POC (TCA) TRICYCLIC ANTIDEPRESSANTS: NEGATIVE
POC TEMPERATURE (URINE): 90
POC THC: NEGATIVE

## 2025-06-16 PROCEDURE — 3008F BODY MASS INDEX DOCD: CPT | Mod: ,,, | Performed by: PHYSICIAN ASSISTANT

## 2025-06-16 PROCEDURE — 99999 PR PBB SHADOW E&M-EST. PATIENT-LVL IV: CPT | Mod: PBBFAC,,, | Performed by: PHYSICIAN ASSISTANT

## 2025-06-16 PROCEDURE — 3080F DIAST BP >= 90 MM HG: CPT | Mod: ,,, | Performed by: PHYSICIAN ASSISTANT

## 2025-06-16 PROCEDURE — 99214 OFFICE O/P EST MOD 30 MIN: CPT | Mod: PBBFAC | Performed by: PHYSICIAN ASSISTANT

## 2025-06-16 PROCEDURE — 3077F SYST BP >= 140 MM HG: CPT | Mod: ,,, | Performed by: PHYSICIAN ASSISTANT

## 2025-06-16 PROCEDURE — 99214 OFFICE O/P EST MOD 30 MIN: CPT | Mod: S$PBB,,, | Performed by: PHYSICIAN ASSISTANT

## 2025-06-16 PROCEDURE — 80305 DRUG TEST PRSMV DIR OPT OBS: CPT | Mod: PBBFAC | Performed by: PHYSICIAN ASSISTANT

## 2025-06-16 PROCEDURE — 3066F NEPHROPATHY DOC TX: CPT | Mod: ,,, | Performed by: PHYSICIAN ASSISTANT

## 2025-06-16 PROCEDURE — 1159F MED LIST DOCD IN RCRD: CPT | Mod: ,,, | Performed by: PHYSICIAN ASSISTANT

## 2025-06-16 PROCEDURE — 99999PBSHW POCT URINE DRUG SCREEN PRESUMP: Mod: PBBFAC,,,

## 2025-06-16 PROCEDURE — 3062F POS MACROALBUMINURIA REV: CPT | Mod: ,,, | Performed by: PHYSICIAN ASSISTANT

## 2025-06-16 RX ORDER — TRAMADOL HYDROCHLORIDE 50 MG/1
50 TABLET, FILM COATED ORAL EVERY 12 HOURS PRN
Qty: 60 TABLET | Refills: 2 | Status: SHIPPED | OUTPATIENT
Start: 2025-06-22

## 2025-06-16 RX ORDER — METHOCARBAMOL 500 MG/1
500 TABLET, FILM COATED ORAL 3 TIMES DAILY PRN
Qty: 90 TABLET | Refills: 0 | Status: SHIPPED | OUTPATIENT
Start: 2025-06-16

## 2025-08-07 ENCOUNTER — PATIENT MESSAGE (OUTPATIENT)
Facility: HOSPITAL | Age: 50
End: 2025-08-07
Payer: COMMERCIAL

## 2025-08-11 ENCOUNTER — RESULTS FOLLOW-UP (OUTPATIENT)
Dept: FAMILY MEDICINE | Facility: CLINIC | Age: 50
End: 2025-08-11
Payer: COMMERCIAL

## 2025-08-11 ENCOUNTER — OFFICE VISIT (OUTPATIENT)
Dept: FAMILY MEDICINE | Facility: CLINIC | Age: 50
End: 2025-08-11
Payer: COMMERCIAL

## 2025-08-11 VITALS
HEIGHT: 61 IN | WEIGHT: 256 LBS | DIASTOLIC BLOOD PRESSURE: 84 MMHG | BODY MASS INDEX: 48.33 KG/M2 | HEART RATE: 78 BPM | SYSTOLIC BLOOD PRESSURE: 136 MMHG | OXYGEN SATURATION: 97 % | TEMPERATURE: 98 F | RESPIRATION RATE: 18 BRPM

## 2025-08-11 DIAGNOSIS — B96.89 ACUTE BACTERIAL SINUSITIS: Primary | ICD-10-CM

## 2025-08-11 DIAGNOSIS — J01.90 ACUTE BACTERIAL SINUSITIS: Primary | ICD-10-CM

## 2025-08-11 DIAGNOSIS — F32.A DEPRESSION, UNSPECIFIED DEPRESSION TYPE: ICD-10-CM

## 2025-08-11 DIAGNOSIS — E89.0 POSTOPERATIVE HYPOTHYROIDISM: ICD-10-CM

## 2025-08-11 DIAGNOSIS — N18.4 STAGE 4 CHRONIC KIDNEY DISEASE: ICD-10-CM

## 2025-08-11 DIAGNOSIS — E78.2 MIXED HYPERLIPIDEMIA: ICD-10-CM

## 2025-08-11 DIAGNOSIS — J45.909 ASTHMA, UNSPECIFIED ASTHMA SEVERITY, UNSPECIFIED WHETHER COMPLICATED, UNSPECIFIED WHETHER PERSISTENT: ICD-10-CM

## 2025-08-11 DIAGNOSIS — E03.0 CONGENITAL HYPOTHYROIDISM WITH DIFFUSE GOITER: ICD-10-CM

## 2025-08-11 DIAGNOSIS — I10 PRIMARY HYPERTENSION: ICD-10-CM

## 2025-08-11 LAB
ALBUMIN SERPL BCP-MCNC: 4 G/DL (ref 3.5–5)
ALBUMIN/GLOB SERPL: 0.9 {RATIO}
ALP SERPL-CCNC: 56 U/L (ref 40–150)
ALT SERPL W P-5'-P-CCNC: 11 U/L
ANION GAP SERPL CALCULATED.3IONS-SCNC: 15 MMOL/L (ref 7–16)
AST SERPL W P-5'-P-CCNC: 27 U/L (ref 11–45)
BASOPHILS # BLD AUTO: 0.06 K/UL (ref 0–0.2)
BASOPHILS NFR BLD AUTO: 0.5 % (ref 0–1)
BILIRUB SERPL-MCNC: 0.4 MG/DL
BUN SERPL-MCNC: 26 MG/DL (ref 10–20)
BUN/CREAT SERPL: 7 (ref 6–20)
CALCIUM SERPL-MCNC: 6.9 MG/DL (ref 8.4–10.2)
CHLORIDE SERPL-SCNC: 103 MMOL/L (ref 98–107)
CHOLEST SERPL-MCNC: 230 MG/DL
CHOLEST/HDLC SERPL: 4.3 {RATIO}
CO2 SERPL-SCNC: 25 MMOL/L (ref 22–29)
CREAT SERPL-MCNC: 3.69 MG/DL (ref 0.55–1.02)
CREAT UR-MCNC: 108 MG/DL (ref 15–325)
DIFFERENTIAL METHOD BLD: ABNORMAL
EGFR (NO RACE VARIABLE) (RUSH/TITUS): 14 ML/MIN/1.73M2
EOSINOPHIL # BLD AUTO: 0.32 K/UL (ref 0–0.5)
EOSINOPHIL NFR BLD AUTO: 2.7 % (ref 1–4)
ERYTHROCYTE [DISTWIDTH] IN BLOOD BY AUTOMATED COUNT: 14.7 % (ref 11.5–14.5)
GLOBULIN SER-MCNC: 4.4 G/DL (ref 2–4)
GLUCOSE SERPL-MCNC: 87 MG/DL (ref 74–100)
HCT VFR BLD AUTO: 38.8 % (ref 38–47)
HDLC SERPL-MCNC: 53 MG/DL (ref 35–60)
HGB BLD-MCNC: 12.1 G/DL (ref 12–16)
IMM GRANULOCYTES # BLD AUTO: 0.02 K/UL (ref 0–0.04)
IMM GRANULOCYTES NFR BLD: 0.2 % (ref 0–0.4)
LDLC SERPL CALC-MCNC: 136 MG/DL
LDLC/HDLC SERPL: 2.6 {RATIO}
LYMPHOCYTES # BLD AUTO: 2.9 K/UL (ref 1–4.8)
LYMPHOCYTES NFR BLD AUTO: 24.5 % (ref 27–41)
MCH RBC QN AUTO: 28.9 PG (ref 27–31)
MCHC RBC AUTO-ENTMCNC: 31.2 G/DL (ref 32–36)
MCV RBC AUTO: 92.6 FL (ref 80–96)
MICROALBUMIN UR-MCNC: 84.6 MG/DL
MICROALBUMIN/CREAT RATIO PNL UR: 783.3 MG/G (ref 0–30)
MONOCYTES # BLD AUTO: 0.82 K/UL (ref 0–0.8)
MONOCYTES NFR BLD AUTO: 6.9 % (ref 2–6)
MPC BLD CALC-MCNC: 10.1 FL (ref 9.4–12.4)
NEUTROPHILS # BLD AUTO: 7.74 K/UL (ref 1.8–7.7)
NEUTROPHILS NFR BLD AUTO: 65.2 % (ref 53–65)
NONHDLC SERPL-MCNC: 177 MG/DL
NRBC # BLD AUTO: 0 X10E3/UL
NRBC, AUTO (.00): 0 %
PLATELET # BLD AUTO: 321 K/UL (ref 150–400)
POTASSIUM SERPL-SCNC: 3.1 MMOL/L (ref 3.5–5.1)
PROT SERPL-MCNC: 8.4 G/DL (ref 6.4–8.3)
RBC # BLD AUTO: 4.19 M/UL (ref 4.2–5.4)
SODIUM SERPL-SCNC: 140 MMOL/L (ref 136–145)
TRIGL SERPL-MCNC: 206 MG/DL (ref 37–140)
TSH SERPL DL<=0.005 MIU/L-ACNC: 3.63 UIU/ML (ref 0.35–4.94)
VLDLC SERPL-MCNC: 41 MG/DL
WBC # BLD AUTO: 11.86 K/UL (ref 4.5–11)

## 2025-08-11 PROCEDURE — 1160F RVW MEDS BY RX/DR IN RCRD: CPT | Mod: ,,, | Performed by: NURSE PRACTITIONER

## 2025-08-11 PROCEDURE — 3062F POS MACROALBUMINURIA REV: CPT | Mod: ,,, | Performed by: NURSE PRACTITIONER

## 2025-08-11 PROCEDURE — 82043 UR ALBUMIN QUANTITATIVE: CPT | Mod: ,,, | Performed by: CLINICAL MEDICAL LABORATORY

## 2025-08-11 PROCEDURE — 99214 OFFICE O/P EST MOD 30 MIN: CPT | Mod: ,,, | Performed by: NURSE PRACTITIONER

## 2025-08-11 PROCEDURE — 80061 LIPID PANEL: CPT | Mod: ,,, | Performed by: CLINICAL MEDICAL LABORATORY

## 2025-08-11 PROCEDURE — 80050 GENERAL HEALTH PANEL: CPT | Mod: ,,, | Performed by: CLINICAL MEDICAL LABORATORY

## 2025-08-11 PROCEDURE — 1159F MED LIST DOCD IN RCRD: CPT | Mod: ,,, | Performed by: NURSE PRACTITIONER

## 2025-08-11 PROCEDURE — 82570 ASSAY OF URINE CREATININE: CPT | Mod: ,,, | Performed by: CLINICAL MEDICAL LABORATORY

## 2025-08-11 PROCEDURE — 3008F BODY MASS INDEX DOCD: CPT | Mod: ,,, | Performed by: NURSE PRACTITIONER

## 2025-08-11 PROCEDURE — 3079F DIAST BP 80-89 MM HG: CPT | Mod: ,,, | Performed by: NURSE PRACTITIONER

## 2025-08-11 PROCEDURE — 3075F SYST BP GE 130 - 139MM HG: CPT | Mod: ,,, | Performed by: NURSE PRACTITIONER

## 2025-08-11 PROCEDURE — 3066F NEPHROPATHY DOC TX: CPT | Mod: ,,, | Performed by: NURSE PRACTITIONER

## 2025-08-11 RX ORDER — AMOXICILLIN AND CLAVULANATE POTASSIUM 500; 125 MG/1; MG/1
1 TABLET, FILM COATED ORAL 2 TIMES DAILY
Qty: 20 TABLET | Refills: 0 | Status: SHIPPED | OUTPATIENT
Start: 2025-08-11

## 2025-08-11 RX ORDER — FLUOXETINE HYDROCHLORIDE 40 MG/1
40 CAPSULE ORAL DAILY
Qty: 90 CAPSULE | Refills: 1 | Status: SHIPPED | OUTPATIENT
Start: 2025-08-11 | End: 2026-02-07

## 2025-08-11 RX ORDER — HYDROXYZINE PAMOATE 50 MG/1
50 CAPSULE ORAL EVERY 8 HOURS PRN
Qty: 90 CAPSULE | Refills: 1 | Status: SHIPPED | OUTPATIENT
Start: 2025-08-11

## 2025-08-11 RX ORDER — POTASSIUM CHLORIDE 750 MG/1
20 TABLET, EXTENDED RELEASE ORAL DAILY
Start: 2025-08-11

## 2025-08-11 RX ORDER — LEVOTHYROXINE SODIUM 125 UG/1
125 TABLET ORAL DAILY
Qty: 90 TABLET | Refills: 1 | Status: SHIPPED | OUTPATIENT
Start: 2025-08-11

## 2025-08-11 RX ORDER — ALBUTEROL SULFATE 90 UG/1
2 INHALANT RESPIRATORY (INHALATION) EVERY 6 HOURS PRN
Qty: 18 G | Refills: 0 | Status: SHIPPED | OUTPATIENT
Start: 2025-08-11 | End: 2026-08-11

## 2025-08-11 RX ORDER — ROSUVASTATIN CALCIUM 10 MG/1
10 TABLET, COATED ORAL DAILY
Qty: 90 TABLET | Refills: 1 | Status: SHIPPED | OUTPATIENT
Start: 2025-08-11

## 2025-08-11 RX ORDER — PREDNISONE 10 MG/1
TABLET ORAL
Qty: 13 TABLET | Refills: 0 | Status: SHIPPED | OUTPATIENT
Start: 2025-08-11

## 2025-08-11 RX ORDER — HYDROXYZINE PAMOATE 50 MG/1
50 CAPSULE ORAL EVERY 8 HOURS PRN
Qty: 90 CAPSULE | Refills: 1 | Status: CANCELLED | OUTPATIENT
Start: 2025-08-11

## 2025-08-11 RX ORDER — AMLODIPINE BESYLATE 10 MG/1
10 TABLET ORAL DAILY
Qty: 90 TABLET | Refills: 1 | Status: SHIPPED | OUTPATIENT
Start: 2025-08-11

## 2025-08-12 PROCEDURE — 88142 CYTOPATH C/V THIN LAYER: CPT | Mod: TC,GCY | Performed by: OBSTETRICS & GYNECOLOGY

## 2025-08-12 PROCEDURE — 87626 HPV SEP HI-RSK TYP&POOL RSLT: CPT | Mod: ,,, | Performed by: CLINICAL MEDICAL LABORATORY

## (undated) DEVICE — SLIPPER FALL PREV YEL BARIAT

## (undated) DEVICE — KIT IV START

## (undated) DEVICE — OXISENSOR ADULT DIGIT N/S

## (undated) DEVICE — NDL TUOHY 22G X 3.5

## (undated) DEVICE — SET EXTENSION CLEARLINK 2INJ

## (undated) DEVICE — TRAY NERVE BLOCK UNIV 10/CA

## (undated) DEVICE — APPLICATOR CHLORAPREP ORN 26ML

## (undated) DEVICE — GLOVE SENSICARE PI SURG 6.5

## (undated) DEVICE — SET EXT STD BORE CATH 7.6IN

## (undated) DEVICE — CATH INTROCAN SAF 2 IV 22GX1IN

## (undated) DEVICE — SOL CONTINU-FLO SET 2 LAV